# Patient Record
Sex: FEMALE | Race: BLACK OR AFRICAN AMERICAN | NOT HISPANIC OR LATINO | Employment: UNEMPLOYED | URBAN - METROPOLITAN AREA
[De-identification: names, ages, dates, MRNs, and addresses within clinical notes are randomized per-mention and may not be internally consistent; named-entity substitution may affect disease eponyms.]

---

## 2019-03-19 ENCOUNTER — OFFICE VISIT (OUTPATIENT)
Dept: SURGERY | Facility: CLINIC | Age: 19
End: 2019-03-19

## 2019-03-19 VITALS — HEART RATE: 92 BPM | OXYGEN SATURATION: 97 % | BODY MASS INDEX: 39.03 KG/M2 | WEIGHT: 242.85 LBS | HEIGHT: 66 IN

## 2019-03-19 DIAGNOSIS — L02.412 ABSCESS OF AXILLA, LEFT: Primary | ICD-10-CM

## 2019-03-19 PROCEDURE — 99203 OFFICE O/P NEW LOW 30 MIN: CPT | Performed by: SURGERY

## 2019-03-19 RX ORDER — CEFAZOLIN SODIUM 2 G/100ML
2 INJECTION, SOLUTION INTRAVENOUS ONCE
Status: CANCELLED | OUTPATIENT
Start: 2019-03-20 | End: 2019-03-19

## 2019-03-19 RX ORDER — CEPHALEXIN 500 MG/1
1 CAPSULE ORAL 2 TIMES DAILY
Refills: 1 | COMMUNITY
Start: 2019-03-04 | End: 2019-03-21 | Stop reason: HOSPADM

## 2019-03-19 RX ORDER — INSULIN DEGLUDEC 200 U/ML
40 INJECTION, SOLUTION SUBCUTANEOUS NIGHTLY
COMMUNITY
Start: 2018-12-10 | End: 2020-03-12 | Stop reason: ALTCHOICE

## 2019-03-19 NOTE — PROGRESS NOTES
"CC: Left axillary swelling     HPI: The patient is a very pleasant 18-year-old female that is here today for evaluation of cystic mass over the left axilla.  She reports having a cystic mass over the left axilla that has been slowly growing for the past month. Last week she went to [place] for sping break and noticed more swelling. She has noticed some redness but no pain or discharge.  She was seen by a dermatologist a week ago and she was started on antibiotics.  She denies any fevers or chills.  She reports having a cyst over that area approximately 2 years ago that was treated with antibiotics.  Otherwise denies any other complaint     PMH: Type 2 diabetes     PSH: Ear tubes surgery    MEDS: Metformin, Keflex     ALL: No known drug allergies    FH and SH: Family history is noncontributory.  The patient is single and goes to college.  She      ROS:   Constitutional: denies any weight changes, fatigue or weakness.  HEENT: Denies hearing loss and rhinorrhea  Cardiovascular: denies chest pain, palpitations, edemas.  Respiratory: denies cough, sputum, SOB.  Gastrointestinal: denies N&V, abd pain, diarrhea, constipation.  Genitourinary: denies dysuria, frequency.  Endocrine: denies cold intolerance, lethargy and flushing.  Hem: denies excessive bruising and postop bleeding.  Musculoskeletal: denies weakness, joint swelling, pain or stiffness.  Neuro: denies seizures, CVA, paresthesia, or peripheral neuropathy.   Skin: denies change in nevi, rashes, masses.     PE:   Vitals:    03/19/19 1416   Pulse: 92   SpO2: 97%   Weight: 110 kg (242 lb 13.6 oz)   Height: 167.6 cm (66\")     Alert and oriented ×3, no acute distress.  Head is normocephalic and atraumatic.  Neck is supple there  There is no thyromegaly or lymphadenopathy  Chest is clear bilaterally there is no added sounds. Over the left axilla there's a cystic mass palpated with 2 different localized areas of redness. There's a brownish drainage from a small skin pit " at the superior aspect of the mass. Minimal tenderness but discomfort over the area.   Regular rate and rhythm, no murmurs  Abdomen is soft and nontender, is nondistended, bowel sounds are positive.  There is no rebound or guarding is and there is no peritoneal signs.  No clubbing cyanosis or edema in lower or upper extremities    Diagnostic studies: None     Assessment and plan    The patient is a very pleasant 17 yo female with what it seem to be an infected sebaceous cyst. Interestingly, there's no much erythema or pain and this make me think this may be a cystic hygroma that's infected. Regardless, I think this need to be drained in the operative room. She will most likely have a large open wound and she will need dressing changes. The patient and her father verbalized understanding and aggred with the plan.     - Plan for incisional drainage of left axillary mass under anesthesia tomorrow  - Cont. Atx     Williams Ryan MD  General, Minimally Invasive and Endoscopic Surgery  [facility]     [address], [address]  [place], [place], [address]  P: [phone]  F: [phone]

## 2019-03-20 ENCOUNTER — HOSPITAL ENCOUNTER (OUTPATIENT)
Facility: HOSPITAL | Age: 19
Discharge: HOME | End: 2019-03-21
Attending: SURGERY | Admitting: SURGERY

## 2019-03-20 ENCOUNTER — ANESTHESIA (OUTPATIENT)
Dept: PERIOP | Facility: HOSPITAL | Age: 19
End: 2019-03-20

## 2019-03-20 ENCOUNTER — ANESTHESIA EVENT (OUTPATIENT)
Dept: PERIOP | Facility: HOSPITAL | Age: 19
End: 2019-03-20

## 2019-03-20 DIAGNOSIS — L02.412 ABSCESS OF AXILLA, LEFT: ICD-10-CM

## 2019-03-20 LAB
ANION GAP SERPL CALCULATED.3IONS-SCNC: 11.2 MMOL/L
B-HCG UR QL: NEGATIVE
BUN BLD-MCNC: 9 MG/DL (ref 6–20)
BUN/CREAT SERPL: 11.4 (ref 7–25)
CALCIUM SPEC-SCNC: 9.8 MG/DL (ref 8.6–10.5)
CHLORIDE SERPL-SCNC: 101 MMOL/L (ref 98–107)
CO2 SERPL-SCNC: 26.8 MMOL/L (ref 22–29)
CREAT BLD-MCNC: 0.79 MG/DL (ref 0.57–1)
DEPRECATED RDW RBC AUTO: 43.6 FL (ref 37–54)
ERYTHROCYTE [DISTWIDTH] IN BLOOD BY AUTOMATED COUNT: 13.2 % (ref 12.3–15.4)
GFR SERPL CREATININE-BSD FRML MDRD: 114 ML/MIN/1.73
GLUCOSE BLD-MCNC: 140 MG/DL (ref 65–99)
GLUCOSE BLDC GLUCOMTR-MCNC: 125 MG/DL (ref 70–130)
GLUCOSE BLDC GLUCOMTR-MCNC: 196 MG/DL (ref 70–130)
GLUCOSE BLDC GLUCOMTR-MCNC: 198 MG/DL (ref 70–130)
HCT VFR BLD AUTO: 36.2 % (ref 34–46.6)
HGB BLD-MCNC: 11.2 G/DL (ref 12–15.9)
INTERNAL NEGATIVE CONTROL: NEGATIVE
INTERNAL POSITIVE CONTROL: POSITIVE
Lab: NORMAL
MCH RBC QN AUTO: 27.9 PG (ref 26.6–33)
MCHC RBC AUTO-ENTMCNC: 30.9 G/DL (ref 31.5–35.7)
MCV RBC AUTO: 90.3 FL (ref 79–97)
PLATELET # BLD AUTO: 273 10*3/MM3 (ref 140–450)
PMV BLD AUTO: 12.1 FL (ref 6–12)
POTASSIUM BLD-SCNC: 4.5 MMOL/L (ref 3.5–5.2)
RBC # BLD AUTO: 4.01 10*6/MM3 (ref 3.77–5.28)
SODIUM BLD-SCNC: 139 MMOL/L (ref 136–145)
WBC NRBC COR # BLD: 8.03 10*3/MM3 (ref 3.4–10.8)

## 2019-03-20 PROCEDURE — 25010000002 HYDRALAZINE PER 20 MG: Performed by: ANESTHESIOLOGY

## 2019-03-20 PROCEDURE — G0378 HOSPITAL OBSERVATION PER HR: HCPCS

## 2019-03-20 PROCEDURE — 81025 URINE PREGNANCY TEST: CPT | Performed by: SURGERY

## 2019-03-20 PROCEDURE — 25010000002 MIDAZOLAM PER 1 MG: Performed by: ANESTHESIOLOGY

## 2019-03-20 PROCEDURE — 85027 COMPLETE CBC AUTOMATED: CPT | Performed by: SURGERY

## 2019-03-20 PROCEDURE — 25010000002 FENTANYL CITRATE (PF) 100 MCG/2ML SOLUTION: Performed by: ANESTHESIOLOGY

## 2019-03-20 PROCEDURE — 87077 CULTURE AEROBIC IDENTIFY: CPT | Performed by: SURGERY

## 2019-03-20 PROCEDURE — 87075 CULTR BACTERIA EXCEPT BLOOD: CPT | Performed by: SURGERY

## 2019-03-20 PROCEDURE — 25010000002 PROPOFOL 10 MG/ML EMULSION: Performed by: ANESTHESIOLOGY

## 2019-03-20 PROCEDURE — 87070 CULTURE OTHR SPECIMN AEROBIC: CPT | Performed by: SURGERY

## 2019-03-20 PROCEDURE — 82962 GLUCOSE BLOOD TEST: CPT

## 2019-03-20 PROCEDURE — 87186 SC STD MICRODIL/AGAR DIL: CPT | Performed by: SURGERY

## 2019-03-20 PROCEDURE — 63710000001 INSULIN LISPRO (HUMAN) PER 5 UNITS: Performed by: SURGERY

## 2019-03-20 PROCEDURE — 88304 TISSUE EXAM BY PATHOLOGIST: CPT | Performed by: SURGERY

## 2019-03-20 PROCEDURE — 25010000003 CEFAZOLIN IN DEXTROSE 2-4 GM/100ML-% SOLUTION: Performed by: SURGERY

## 2019-03-20 PROCEDURE — 87205 SMEAR GRAM STAIN: CPT | Performed by: SURGERY

## 2019-03-20 PROCEDURE — 25010000002 ENOXAPARIN PER 10 MG: Performed by: SURGERY

## 2019-03-20 PROCEDURE — 80048 BASIC METABOLIC PNL TOTAL CA: CPT | Performed by: SURGERY

## 2019-03-20 PROCEDURE — 10061 I&D ABSCESS COMP/MULTIPLE: CPT | Performed by: SURGERY

## 2019-03-20 PROCEDURE — 63710000001 INSULIN GLARGINE PER 5 UNITS: Performed by: SURGERY

## 2019-03-20 RX ORDER — PROMETHAZINE HYDROCHLORIDE 25 MG/ML
6.25 INJECTION, SOLUTION INTRAMUSCULAR; INTRAVENOUS ONCE AS NEEDED
Status: DISCONTINUED | OUTPATIENT
Start: 2019-03-20 | End: 2019-03-20 | Stop reason: HOSPADM

## 2019-03-20 RX ORDER — HYDROCODONE BITARTRATE AND ACETAMINOPHEN 5; 325 MG/1; MG/1
1 TABLET ORAL EVERY 6 HOURS PRN
Status: DISCONTINUED | OUTPATIENT
Start: 2019-03-20 | End: 2019-03-21 | Stop reason: HOSPADM

## 2019-03-20 RX ORDER — MIDAZOLAM HYDROCHLORIDE 1 MG/ML
1 INJECTION INTRAMUSCULAR; INTRAVENOUS
Status: DISCONTINUED | OUTPATIENT
Start: 2019-03-20 | End: 2019-03-20 | Stop reason: HOSPADM

## 2019-03-20 RX ORDER — HYDROMORPHONE HYDROCHLORIDE 1 MG/ML
0.25 INJECTION, SOLUTION INTRAMUSCULAR; INTRAVENOUS; SUBCUTANEOUS
Status: DISCONTINUED | OUTPATIENT
Start: 2019-03-20 | End: 2019-03-20 | Stop reason: HOSPADM

## 2019-03-20 RX ORDER — MEPERIDINE HYDROCHLORIDE 25 MG/ML
12.5 INJECTION INTRAMUSCULAR; INTRAVENOUS; SUBCUTANEOUS
Status: DISCONTINUED | OUTPATIENT
Start: 2019-03-20 | End: 2019-03-20 | Stop reason: HOSPADM

## 2019-03-20 RX ORDER — FENTANYL CITRATE 50 UG/ML
50 INJECTION, SOLUTION INTRAMUSCULAR; INTRAVENOUS
Status: DISCONTINUED | OUTPATIENT
Start: 2019-03-20 | End: 2019-03-20 | Stop reason: HOSPADM

## 2019-03-20 RX ORDER — ACETAMINOPHEN 325 MG/1
650 TABLET ORAL ONCE AS NEEDED
Status: DISCONTINUED | OUTPATIENT
Start: 2019-03-20 | End: 2019-03-20 | Stop reason: HOSPADM

## 2019-03-20 RX ORDER — SODIUM CHLORIDE 0.9 % (FLUSH) 0.9 %
3 SYRINGE (ML) INJECTION EVERY 12 HOURS SCHEDULED
Status: DISCONTINUED | OUTPATIENT
Start: 2019-03-20 | End: 2019-03-20 | Stop reason: HOSPADM

## 2019-03-20 RX ORDER — SODIUM CHLORIDE 9 MG/ML
100 INJECTION, SOLUTION INTRAVENOUS CONTINUOUS
Status: DISCONTINUED | OUTPATIENT
Start: 2019-03-20 | End: 2019-03-20

## 2019-03-20 RX ORDER — SODIUM CHLORIDE 0.9 % (FLUSH) 0.9 %
1-10 SYRINGE (ML) INJECTION AS NEEDED
Status: DISCONTINUED | OUTPATIENT
Start: 2019-03-20 | End: 2019-03-20 | Stop reason: HOSPADM

## 2019-03-20 RX ORDER — LABETALOL HYDROCHLORIDE 5 MG/ML
5 INJECTION, SOLUTION INTRAVENOUS
Status: DISCONTINUED | OUTPATIENT
Start: 2019-03-20 | End: 2019-03-20 | Stop reason: HOSPADM

## 2019-03-20 RX ORDER — INSULIN GLARGINE 100 [IU]/ML
40 INJECTION, SOLUTION SUBCUTANEOUS NIGHTLY
Status: DISCONTINUED | OUTPATIENT
Start: 2019-03-20 | End: 2019-03-21 | Stop reason: HOSPADM

## 2019-03-20 RX ORDER — PROMETHAZINE HYDROCHLORIDE 25 MG/1
25 TABLET ORAL ONCE AS NEEDED
Status: DISCONTINUED | OUTPATIENT
Start: 2019-03-20 | End: 2019-03-20 | Stop reason: HOSPADM

## 2019-03-20 RX ORDER — SODIUM CHLORIDE 0.9 % (FLUSH) 0.9 %
3-10 SYRINGE (ML) INJECTION AS NEEDED
Status: DISCONTINUED | OUTPATIENT
Start: 2019-03-20 | End: 2019-03-21 | Stop reason: HOSPADM

## 2019-03-20 RX ORDER — PROMETHAZINE HYDROCHLORIDE 25 MG/ML
12.5 INJECTION, SOLUTION INTRAMUSCULAR; INTRAVENOUS EVERY 6 HOURS PRN
Status: DISCONTINUED | OUTPATIENT
Start: 2019-03-20 | End: 2019-03-21 | Stop reason: HOSPADM

## 2019-03-20 RX ORDER — DEXTROSE MONOHYDRATE 25 G/50ML
25 INJECTION, SOLUTION INTRAVENOUS
Status: DISCONTINUED | OUTPATIENT
Start: 2019-03-20 | End: 2019-03-21 | Stop reason: HOSPADM

## 2019-03-20 RX ORDER — MAGNESIUM HYDROXIDE 1200 MG/15ML
LIQUID ORAL AS NEEDED
Status: DISCONTINUED | OUTPATIENT
Start: 2019-03-20 | End: 2019-03-20 | Stop reason: HOSPADM

## 2019-03-20 RX ORDER — PROPOFOL 10 MG/ML
VIAL (ML) INTRAVENOUS AS NEEDED
Status: DISCONTINUED | OUTPATIENT
Start: 2019-03-20 | End: 2019-03-20 | Stop reason: SURG

## 2019-03-20 RX ORDER — SODIUM CHLORIDE, SODIUM LACTATE, POTASSIUM CHLORIDE, CALCIUM CHLORIDE 600; 310; 30; 20 MG/100ML; MG/100ML; MG/100ML; MG/100ML
9 INJECTION, SOLUTION INTRAVENOUS CONTINUOUS
Status: DISCONTINUED | OUTPATIENT
Start: 2019-03-20 | End: 2019-03-20

## 2019-03-20 RX ORDER — FAMOTIDINE 10 MG/ML
20 INJECTION, SOLUTION INTRAVENOUS ONCE
Status: COMPLETED | OUTPATIENT
Start: 2019-03-20 | End: 2019-03-20

## 2019-03-20 RX ORDER — PROMETHAZINE HYDROCHLORIDE 25 MG/1
25 SUPPOSITORY RECTAL ONCE AS NEEDED
Status: DISCONTINUED | OUTPATIENT
Start: 2019-03-20 | End: 2019-03-20 | Stop reason: HOSPADM

## 2019-03-20 RX ORDER — GLYCOPYRROLATE 0.2 MG/ML
0.2 INJECTION INTRAMUSCULAR; INTRAVENOUS
Status: COMPLETED | OUTPATIENT
Start: 2019-03-20 | End: 2019-03-20

## 2019-03-20 RX ORDER — BUPIVACAINE HYDROCHLORIDE AND EPINEPHRINE 5; 5 MG/ML; UG/ML
INJECTION, SOLUTION EPIDURAL; INTRACAUDAL; PERINEURAL AS NEEDED
Status: DISCONTINUED | OUTPATIENT
Start: 2019-03-20 | End: 2019-03-20 | Stop reason: HOSPADM

## 2019-03-20 RX ORDER — ACETAMINOPHEN 325 MG/1
650 TABLET ORAL EVERY 4 HOURS PRN
Status: DISCONTINUED | OUTPATIENT
Start: 2019-03-20 | End: 2019-03-21 | Stop reason: HOSPADM

## 2019-03-20 RX ORDER — CEFAZOLIN SODIUM 2 G/100ML
2 INJECTION, SOLUTION INTRAVENOUS ONCE
Status: COMPLETED | OUTPATIENT
Start: 2019-03-20 | End: 2019-03-20

## 2019-03-20 RX ORDER — HYDRALAZINE HYDROCHLORIDE 20 MG/ML
5 INJECTION INTRAMUSCULAR; INTRAVENOUS
Status: DISCONTINUED | OUTPATIENT
Start: 2019-03-20 | End: 2019-03-20 | Stop reason: HOSPADM

## 2019-03-20 RX ORDER — PROPOFOL 10 MG/ML
VIAL (ML) INTRAVENOUS CONTINUOUS PRN
Status: DISCONTINUED | OUTPATIENT
Start: 2019-03-20 | End: 2019-03-20 | Stop reason: SURG

## 2019-03-20 RX ORDER — SULFAMETHOXAZOLE AND TRIMETHOPRIM 400; 80 MG/1; MG/1
1 TABLET ORAL EVERY 12 HOURS SCHEDULED
Status: DISCONTINUED | OUTPATIENT
Start: 2019-03-20 | End: 2019-03-21

## 2019-03-20 RX ORDER — MIDAZOLAM HYDROCHLORIDE 1 MG/ML
2 INJECTION INTRAMUSCULAR; INTRAVENOUS
Status: DISCONTINUED | OUTPATIENT
Start: 2019-03-20 | End: 2019-03-20 | Stop reason: HOSPADM

## 2019-03-20 RX ORDER — LIDOCAINE HYDROCHLORIDE 10 MG/ML
0.5 INJECTION, SOLUTION EPIDURAL; INFILTRATION; INTRACAUDAL; PERINEURAL ONCE AS NEEDED
Status: DISCONTINUED | OUTPATIENT
Start: 2019-03-20 | End: 2019-03-20 | Stop reason: HOSPADM

## 2019-03-20 RX ORDER — DIPHENHYDRAMINE HYDROCHLORIDE 50 MG/ML
12.5 INJECTION INTRAMUSCULAR; INTRAVENOUS
Status: DISCONTINUED | OUTPATIENT
Start: 2019-03-20 | End: 2019-03-20 | Stop reason: HOSPADM

## 2019-03-20 RX ORDER — NICOTINE POLACRILEX 4 MG
15 LOZENGE BUCCAL
Status: DISCONTINUED | OUTPATIENT
Start: 2019-03-20 | End: 2019-03-21 | Stop reason: HOSPADM

## 2019-03-20 RX ORDER — LIDOCAINE HYDROCHLORIDE 20 MG/ML
INJECTION, SOLUTION INFILTRATION; PERINEURAL AS NEEDED
Status: DISCONTINUED | OUTPATIENT
Start: 2019-03-20 | End: 2019-03-20 | Stop reason: SURG

## 2019-03-20 RX ORDER — FENTANYL CITRATE 50 UG/ML
100 INJECTION, SOLUTION INTRAMUSCULAR; INTRAVENOUS
Status: DISCONTINUED | OUTPATIENT
Start: 2019-03-20 | End: 2019-03-20 | Stop reason: HOSPADM

## 2019-03-20 RX ORDER — SODIUM CHLORIDE 0.9 % (FLUSH) 0.9 %
3 SYRINGE (ML) INJECTION EVERY 12 HOURS SCHEDULED
Status: DISCONTINUED | OUTPATIENT
Start: 2019-03-20 | End: 2019-03-21 | Stop reason: HOSPADM

## 2019-03-20 RX ADMIN — FENTANYL CITRATE 50 MCG: 50 INJECTION INTRAMUSCULAR; INTRAVENOUS at 12:28

## 2019-03-20 RX ADMIN — Medication 1 MG: at 11:40

## 2019-03-20 RX ADMIN — Medication 3 ML: at 15:03

## 2019-03-20 RX ADMIN — HYDRALAZINE HYDROCHLORIDE 5 MG: 20 INJECTION INTRAMUSCULAR; INTRAVENOUS at 13:35

## 2019-03-20 RX ADMIN — FENTANYL CITRATE 50 MCG: 50 INJECTION, SOLUTION INTRAMUSCULAR; INTRAVENOUS at 13:49

## 2019-03-20 RX ADMIN — GLYCOPYRROLATE 0.2 MG: 0.2 INJECTION, SOLUTION INTRAMUSCULAR; INTRAVENOUS at 11:40

## 2019-03-20 RX ADMIN — FAMOTIDINE 20 MG: 10 INJECTION INTRAVENOUS at 11:40

## 2019-03-20 RX ADMIN — SODIUM CHLORIDE, POTASSIUM CHLORIDE, SODIUM LACTATE AND CALCIUM CHLORIDE: 600; 310; 30; 20 INJECTION, SOLUTION INTRAVENOUS at 11:52

## 2019-03-20 RX ADMIN — FENTANYL CITRATE 50 MCG: 50 INJECTION, SOLUTION INTRAMUSCULAR; INTRAVENOUS at 13:25

## 2019-03-20 RX ADMIN — Medication 1 MG: at 12:24

## 2019-03-20 RX ADMIN — INSULIN LISPRO 2 UNITS: 100 INJECTION, SOLUTION INTRAVENOUS; SUBCUTANEOUS at 17:33

## 2019-03-20 RX ADMIN — SODIUM CHLORIDE, PRESERVATIVE FREE 3 ML: 5 INJECTION INTRAVENOUS at 20:48

## 2019-03-20 RX ADMIN — PROPOFOL 150 MG: 10 INJECTION, EMULSION INTRAVENOUS at 12:24

## 2019-03-20 RX ADMIN — INSULIN LISPRO 2 UNITS: 100 INJECTION, SOLUTION INTRAVENOUS; SUBCUTANEOUS at 23:11

## 2019-03-20 RX ADMIN — CEFAZOLIN SODIUM 2 G: 2 INJECTION, SOLUTION INTRAVENOUS at 12:25

## 2019-03-20 RX ADMIN — METFORMIN HYDROCHLORIDE 500 MG: 500 TABLET ORAL at 18:18

## 2019-03-20 RX ADMIN — ENOXAPARIN SODIUM 30 MG: 30 INJECTION SUBCUTANEOUS at 20:53

## 2019-03-20 RX ADMIN — Medication 1 MG: at 12:28

## 2019-03-20 RX ADMIN — INSULIN GLARGINE 40 UNITS: 100 INJECTION, SOLUTION SUBCUTANEOUS at 23:11

## 2019-03-20 RX ADMIN — LIDOCAINE HYDROCHLORIDE 40 MG: 20 INJECTION, SOLUTION INFILTRATION; PERINEURAL at 12:24

## 2019-03-20 RX ADMIN — SULFAMETHOXAZOLE AND TRIMETHOPRIM 1 TABLET: 400; 80 TABLET ORAL at 20:48

## 2019-03-20 RX ADMIN — SODIUM CHLORIDE 100 ML/HR: 9 INJECTION, SOLUTION INTRAVENOUS at 15:02

## 2019-03-20 RX ADMIN — FENTANYL CITRATE 50 MCG: 50 INJECTION INTRAMUSCULAR; INTRAVENOUS at 12:24

## 2019-03-20 RX ADMIN — PROPOFOL 100 MCG/KG/MIN: 10 INJECTION, EMULSION INTRAVENOUS at 12:24

## 2019-03-20 NOTE — ANESTHESIA POSTPROCEDURE EVALUATION
Patient: Frances Overall    Procedure Summary     Date:  03/20/19 Room / Location:  [facility] OR [address] [place] [address] MAIN OR    Anesthesia Start:  1220 Anesthesia Stop:  1314    Procedure:  Incisional drainage of left axillary abscess (Left ) Diagnosis:       Abscess of axilla, left      (Abscess of axilla, left [L02.412])    Surgeon:  Williams Ryan MD Provider:  Mayur Eng MD    Anesthesia Type:  MAC ASA Status:  2          Anesthesia Type: MAC  Last vitals  BP   144/88 (03/20/19 1345)   Temp   36.6 °C (97.8 °F) (03/20/19 1308)   Pulse   64 (03/20/19 1345)   Resp   16 (03/20/19 1345)     SpO2   100 % (03/20/19 1345)     Post Anesthesia Care and Evaluation    Patient location during evaluation: bedside  Patient participation: complete - patient participated  Level of consciousness: awake  Pain score: 1  Pain management: adequate  Airway patency: patent  Anesthetic complications: No anesthetic complications    Cardiovascular status: acceptable  Respiratory status: acceptable  Hydration status: acceptable    Comments: --------------------            03/20/19               1345     --------------------   BP:       144/88     Pulse:      64       Resp:       16       Temp:                SpO2:      100%     --------------------

## 2019-03-20 NOTE — ANESTHESIA POSTPROCEDURE EVALUATION
Patient: Frances Overall    Procedure Summary     Date:  03/20/19 Room / Location:  [facility] OR [address] [place] [address] MAIN OR    Anesthesia Start:  1220 Anesthesia Stop:  1314    Procedure:  Incisional drainage of left axillary abscess (Left ) Diagnosis:       Abscess of axilla, left      (Abscess of axilla, left [L02.412])    Surgeon:  Williams Ryan MD Provider:  Mayur Eng MD    Anesthesia Type:  MAC ASA Status:  2          Anesthesia Type: MAC  Last vitals  BP   144/88 (03/20/19 1345)   Temp   36.6 °C (97.8 °F) (03/20/19 1308)   Pulse   64 (03/20/19 1345)   Resp   16 (03/20/19 1345)     SpO2   100 % (03/20/19 1345)     Post Anesthesia Care and Evaluation    Patient location during evaluation: PACU  Patient participation: complete - patient participated  Level of consciousness: awake and alert  Pain management: adequate  Airway patency: patent  Anesthetic complications: No anesthetic complications    Cardiovascular status: acceptable  Respiratory status: acceptable  Hydration status: acceptable    Comments: --------------------            03/20/19               1345     --------------------   BP:       144/88     Pulse:      64       Resp:       16       Temp:                SpO2:      100%     --------------------

## 2019-03-20 NOTE — ANESTHESIA PREPROCEDURE EVALUATION
Anesthesia Evaluation     Patient summary reviewed and Nursing notes reviewed   NPO Solid Status: > 8 hours             Airway   Mallampati: II  TM distance: >3 FB  Neck ROM: full  no difficulty expected  Dental - normal exam     Pulmonary - negative pulmonary ROS and normal exam   Cardiovascular - negative cardio ROS and normal exam        Neuro/Psych- negative ROS  GI/Hepatic/Renal/Endo    (+) morbid obesity,  diabetes mellitus using insulin,     Musculoskeletal (-) negative ROS    Abdominal  - normal exam   Substance History - negative use     OB/GYN negative ob/gyn ROS         Other                        Anesthesia Plan    ASA 2     MAC and general     intravenous induction   Anesthetic plan, all risks, benefits, and alternatives have been provided, discussed and informed consent has been obtained with: patient.    Plan discussed with CRNA.

## 2019-03-21 VITALS
WEIGHT: 244.3 LBS | DIASTOLIC BLOOD PRESSURE: 62 MMHG | OXYGEN SATURATION: 99 % | HEIGHT: 66 IN | BODY MASS INDEX: 39.26 KG/M2 | SYSTOLIC BLOOD PRESSURE: 113 MMHG | TEMPERATURE: 97.9 F | RESPIRATION RATE: 16 BRPM | HEART RATE: 69 BPM

## 2019-03-21 PROBLEM — E66.9 OBESITY (BMI 35.0-39.9 WITHOUT COMORBIDITY): Status: ACTIVE | Noted: 2019-03-21

## 2019-03-21 PROBLEM — L02.412 ABSCESS OF AXILLA, LEFT: Status: RESOLVED | Noted: 2019-03-19 | Resolved: 2019-03-21

## 2019-03-21 PROBLEM — E11.9 DIABETES (HCC): Status: ACTIVE | Noted: 2019-03-21

## 2019-03-21 LAB
CYTO UR: NORMAL
GLUCOSE BLDC GLUCOMTR-MCNC: 111 MG/DL (ref 70–130)
GLUCOSE BLDC GLUCOMTR-MCNC: 119 MG/DL (ref 70–130)
GLUCOSE BLDC GLUCOMTR-MCNC: 146 MG/DL (ref 70–130)
LAB AP CASE REPORT: NORMAL
PATH REPORT.FINAL DX SPEC: NORMAL
PATH REPORT.GROSS SPEC: NORMAL

## 2019-03-21 PROCEDURE — 25010000002 ENOXAPARIN PER 10 MG: Performed by: SURGERY

## 2019-03-21 PROCEDURE — 82962 GLUCOSE BLOOD TEST: CPT

## 2019-03-21 PROCEDURE — 99024 POSTOP FOLLOW-UP VISIT: CPT | Performed by: SURGERY

## 2019-03-21 PROCEDURE — G0378 HOSPITAL OBSERVATION PER HR: HCPCS

## 2019-03-21 RX ORDER — AMOXICILLIN AND CLAVULANATE POTASSIUM 500; 125 MG/1; MG/1
1 TABLET, FILM COATED ORAL EVERY 8 HOURS SCHEDULED
Status: DISCONTINUED | OUTPATIENT
Start: 2019-03-21 | End: 2019-03-21 | Stop reason: HOSPADM

## 2019-03-21 RX ORDER — AMOXICILLIN AND CLAVULANATE POTASSIUM 500; 125 MG/1; MG/1
1 TABLET, FILM COATED ORAL EVERY 8 HOURS SCHEDULED
Qty: 21 TABLET | Refills: 0 | Status: SHIPPED | OUTPATIENT
Start: 2019-03-21 | End: 2019-03-28

## 2019-03-21 RX ORDER — AMOXICILLIN 250 MG
2 CAPSULE ORAL DAILY PRN
Qty: 30 TABLET | Refills: 1 | Status: SHIPPED | OUTPATIENT
Start: 2019-03-21 | End: 2019-03-29

## 2019-03-21 RX ORDER — SULFAMETHOXAZOLE AND TRIMETHOPRIM 800; 160 MG/1; MG/1
1 TABLET ORAL EVERY 12 HOURS SCHEDULED
Status: DISCONTINUED | OUTPATIENT
Start: 2019-03-21 | End: 2019-03-21

## 2019-03-21 RX ORDER — HYDROCODONE BITARTRATE AND ACETAMINOPHEN 5; 325 MG/1; MG/1
1 TABLET ORAL EVERY 6 HOURS PRN
Qty: 20 TABLET | Refills: 0 | Status: SHIPPED | OUTPATIENT
Start: 2019-03-21 | End: 2019-03-30

## 2019-03-21 RX ADMIN — METFORMIN HYDROCHLORIDE 500 MG: 500 TABLET ORAL at 08:54

## 2019-03-21 RX ADMIN — SULFAMETHOXAZOLE AND TRIMETHOPRIM 1 TABLET: 400; 80 TABLET ORAL at 08:54

## 2019-03-21 RX ADMIN — SODIUM CHLORIDE, PRESERVATIVE FREE 3 ML: 5 INJECTION INTRAVENOUS at 08:55

## 2019-03-21 RX ADMIN — ENOXAPARIN SODIUM 30 MG: 30 INJECTION SUBCUTANEOUS at 08:54

## 2019-03-22 LAB
BACTERIA SPEC AEROBE CULT: ABNORMAL
BACTERIA SPEC AEROBE CULT: ABNORMAL
GRAM STN SPEC: ABNORMAL

## 2019-03-25 LAB — BACTERIA SPEC ANAEROBE CULT: NORMAL

## 2019-03-29 ENCOUNTER — OFFICE VISIT (OUTPATIENT)
Dept: SURGERY | Facility: CLINIC | Age: 19
End: 2019-03-29

## 2019-03-29 DIAGNOSIS — Z51.89 VISIT FOR WOUND CHECK: Primary | ICD-10-CM

## 2019-03-29 PROCEDURE — 99024 POSTOP FOLLOW-UP VISIT: CPT | Performed by: SURGERY

## 2019-03-29 NOTE — PROGRESS NOTES
CC: Follow-up after incision and drainage of left axillary abscess    S: Patient is here today for follow-up and wound check.  She reports no complaints other than mild pain with dressing changes.  Otherwise denies any fevers or chills.  She has been doing wound VAC dressing changes every 3 days    O:   Alert and oriented x3, no acute distress  Over the left axilla there is an open wound that is approximately 3 x 2 cm and there is granulation tissue at the base.  There is no drainage.  Wound VAC is in place       Wound Culture Heavy growth (4+) Klebsiella pneumoniae Abnormal        Heavy growth (4+) Proteus mirabilis Abnormal               Gram Stain  Many (4+) WBCs seen      Few (2+) Gram negative bacilli      Few (2+) Gram positive cocci in clusters      Few (2+) Gram positive bacilli            Resulting Agency: [facility]   Susceptibility      Klebsiella pneumoniae     BHARAT     Ampicillin >=32 ug/ml Resistant     Ampicillin + Sulbactam 8 ug/ml Susceptible     Cefazolin <=4 ug/ml Susceptible     Cefepime <=1 ug/ml Susceptible     Ceftazidime <=1 ug/ml Susceptible     Ceftriaxone <=1 ug/ml Susceptible     Gentamicin <=1 ug/ml Susceptible     Levofloxacin <=0.12 ug/ml Susceptible     Piperacillin + Tazobactam <=4 ug/ml Susceptible     Tetracycline <=1 ug/ml Susceptible     Trimethoprim + Sulfamethoxazole <=20 ug/ml Susceptible           Susceptibility      Proteus mirabilis     BHARAT     Ampicillin <=2 ug/ml Susceptible     Ampicillin + Sulbactam <=2 ug/ml Susceptible     Cefazolin <=4 ug/ml Susceptible     Cefepime <=1 ug/ml Susceptible     Ceftazidime <=1 ug/ml Susceptible     Ceftriaxone <=1 ug/ml Susceptible     Gentamicin <=1 ug/ml Susceptible     Levofloxacin <=0.12 ug/ml Susceptible     Piperacillin + Tazobactam <=4 ug/ml Susceptible     Tetracycline >=16 ug/ml Resistant     Trimethoprim + Sulfamethoxazole <=20 ug/ml Susceptible                 Specimen Collected: 03/20/19 12:44        Assessment and  plan    18-year-old female status post incision and drainage of large left axillary abscess.  The wound is healing great without any signs of infection.  She has been performing wound VAC dressing changes every 3 days.  Discussed with the patient about further steps.  I think that if she feels comfortable performing wet-to-dry dressing changes then that may be easier to having a wound VAC while she is at school.  She will follow-up with wound care clinic at [place] and decide whether she wants to continue with wound VAC which she will be able to switch to wet-to-dry dressing changes twice a day.    -Continue wound VAC dressing changes every 3 days  -Complete course of antibiotics  -Follow-up in my office in 2 weeks    Williams Ryan MD, FACS  General, Minimally Invasive and Endoscopic Surgery  [facility]    [address] [place] [address], [address]  [place], [place], [address]  P: [phone]  F: [phone]

## 2020-03-12 ENCOUNTER — OFFICE VISIT (OUTPATIENT)
Dept: SURGERY | Facility: CLINIC | Age: 20
End: 2020-03-12

## 2020-03-12 VITALS — OXYGEN SATURATION: 98 % | WEIGHT: 241 LBS | HEART RATE: 85 BPM | BODY MASS INDEX: 38.73 KG/M2 | HEIGHT: 66 IN

## 2020-03-12 DIAGNOSIS — L72.3 SEBACEOUS CYST: Primary | ICD-10-CM

## 2020-03-12 PROCEDURE — 99213 OFFICE O/P EST LOW 20 MIN: CPT | Performed by: SURGERY

## 2020-03-12 RX ORDER — SULFAMETHOXAZOLE AND TRIMETHOPRIM 800; 160 MG/1; MG/1
1 TABLET ORAL 2 TIMES DAILY
Qty: 20 TABLET | Refills: 0 | Status: SHIPPED | OUTPATIENT
Start: 2020-03-12 | End: 2020-03-24

## 2020-03-12 RX ORDER — ONDANSETRON 2 MG/ML
4 INJECTION INTRAMUSCULAR; INTRAVENOUS EVERY 6 HOURS PRN
Status: CANCELLED | OUTPATIENT
Start: 2020-03-12

## 2020-03-13 NOTE — PROGRESS NOTES
Office Consultation    No ref. provider found    Chief Complaint   Patient presents with   • Left Axillary Pain       HPI:       Frances Berkowitz is a 19 y.o. female who presents for evaluation of a subcutaneous nodule located over the left axilla.   The patient has history of incision and drainage of left axillary abscess that was done by me on March 2019.  Approximately, 5 days ago she started having left axillary pain.  She denies any fever, chills or drainage.  Pain happens with movement of the arm and gets better with rest.  She has been taking Tylenol with good control of her symptoms.  She denies any fevers or chills.  No redness    Past Medical History:   Diagnosis Date   • Diabetes mellitus (CMS/HCC)     Type II       Past Surgical History:   Procedure Laterality Date   • EAR TUBES     • INCISION AND DRAINAGE TRUNK Left 3/20/2019    Procedure: Incisional drainage of left axillary abscess;  Surgeon: Williams Ryan MD;  Location: [facility];  Service: General       Current Outpatient Medications on File Prior to Visit   Medication Sig Dispense Refill   • Dulaglutide (TRULICITY) 0.75 MG/0.5ML solution pen-injector INJECT 1 DOSE SUBCUTANEOUSLY ONCE A WEEK     • metFORMIN (GLUCOPHAGE) 500 MG tablet Take 500 mg by mouth 2 (Two) Times a Day With Meals.       No current facility-administered medications on file prior to visit.        No Known Allergies    Social History     Socioeconomic History   • Marital status: Single     Spouse name: Not on file   • Number of children: Not on file   • Years of education: Not on file   • Highest education level: Not on file   Tobacco Use   • Smoking status: Never Smoker   • Smokeless tobacco: Never Used   Substance and Sexual Activity   • Alcohol use: No   • Drug use: No   • Sexual activity: Defer       Family History   Problem Relation Age of Onset   • Diabetes Mother    • No Known Problems Father        REVIEW OF SYSTEMS    CONSTITUTIONAL: Denies fevers,  "chills, unintentional weight loss or weight gai  RESPIRATORY: Denies chronic cough or sob.   CARDIAC: Denies chest pain, palpitations, edema  GI: Denies dyspepsia, reflux, heartburn, nausea, vomiting, diarrhea or constipation  : Denies dysuria or hematuria.    MUSCULOSKELETAL: Denies muscle weakness and pain   NEURO: Denies chronic headaches.   ENDOCRINE: Denies significant heat or cold intolerance or history of thyroid problems.    DERM: no rashes,lesions or discharge.     Physical Examination  Pulse 85   Ht 167.6 cm (66\")   Wt 109 kg (241 lb)   SpO2 98%   BMI 38.90 kg/m²   Body mass index is 38.9 kg/m².  GENERAL:alert, well appearing, and in no distress and oriented to person, place, and time  HEENT: normochephalic, atraumatic, no scleral icterus moist mucous membranes.  NECK: Supple there is no thyromegaly or lymphadenopathy  CHEST: clear to auscultation, no wheezes, rales or rhonchi, symmetric air entry.  Over the left axilla there is a well-healed scar.  There is tender 2 x 2 centimeter subcutaneous mass.  There is no erythema or signs of infection  CARDIAC: regular rate and rhythm    NEURO: alert and oriented, normal speech, cranial nerves 2-12 grossly intact, no focal deficits      Assessment:     The patient is a very pleasant 19 y.o. female with a subcutaneous mass located at the left axilla.  She had prior incision and drainage of very large infected sebaceous cyst.  I think that her pain is related to irritation of the cyst that was prior incised.  I discussed with her about further step.  I recommend that she undergoes excisional biopsy of left axillary cyst.  Risk and benefits of the procedure including bleeding, infection, wound complications including dehiscing, infection.  She verbalized understanding and agreed with the plan  I also recommend that she starts Bactrim for 10 days due to the risk of infection and ongoing pain    Plan:          1.  Plan for excisional biopsy of left axillary soft " tissue mass  2.  I prescribed Bactrim for 10 days  3.  Surgical scheduling started    Williams Ryan MD  General, Minimally Invasive and Endoscopic Surgery  [facility]    [address], [address]  [place], [place], [address]  P: [phone]  F: [phone]

## 2020-03-24 ENCOUNTER — TELEPHONE (OUTPATIENT)
Dept: SURGERY | Facility: CLINIC | Age: 20
End: 2020-03-24

## 2020-03-24 RX ORDER — DOXYCYCLINE HYCLATE 100 MG/1
100 TABLET, DELAYED RELEASE ORAL 2 TIMES DAILY
Qty: 20 TABLET | Refills: 0 | Status: SHIPPED | OUTPATIENT
Start: 2020-03-24 | End: 2020-09-01

## 2020-03-24 NOTE — TELEPHONE ENCOUNTER
Received prior authorization request from pt's pharmacy for doxycycline that was prescribed today. The prescription was was for doxycycline 100 mg enteric coated tablets. Spoke with Dr. Ryan who advised that the prescription can be changed to regular doxycycline 100 mg tablets. Called pharmacy and changed pt's prescription.

## 2020-03-24 NOTE — TELEPHONE ENCOUNTER
Start taking doxicicline for 10 days, stop bactrim, if continue to have issues then can call and get seen

## 2020-03-24 NOTE — TELEPHONE ENCOUNTER
Pt's sx was 3/30 got cancelled due to coronavirus now experiencing pain swelling & red streaks what to do?

## 2020-03-25 NOTE — TELEPHONE ENCOUNTER
The Doxycycline 100 mg tablet also required a PA so the pharmacy changed the RX to capsule form and the patients out-of-pocket cost will be $6.00

## 2020-03-30 ENCOUNTER — TELEPHONE (OUTPATIENT)
Dept: SURGERY | Facility: CLINIC | Age: 20
End: 2020-03-30

## 2020-03-30 NOTE — TELEPHONE ENCOUNTER
Patient's mother called again stating the abscess has ruptured and has had quite a bit of purulent drainage. She wanted to know what you recommend as far as wound care. Advised that they should wash the area with soap and water and keep covered with dry gauze. Any further recommendations for wound care? Do you want to see pt in office?

## 2020-03-30 NOTE — TELEPHONE ENCOUNTER
Pts mother called in this morning stating her daughter is still having issues after taking this antibiotic. Says getting bigger and having a hard time putting arm down without discomfort. Your last message stated if she didn't get any better would need to come in for an appt. Should I schedule her to see you ? Please advise    [phone]

## 2020-03-30 NOTE — TELEPHONE ENCOUNTER
Spoke with patient mother, states patient abscess busted and started draining. She is experience great relief from drainage. Discussed with her about the need to clean the wound with soap and water and cover it with gauze at least twice a day. Patient to call me on fridat and let me know how shes doing or early if worsening pain. She understood

## 2020-05-15 ENCOUNTER — TELEPHONE (OUTPATIENT)
Dept: SURGERY | Facility: CLINIC | Age: 20
End: 2020-05-15

## 2020-05-15 NOTE — TELEPHONE ENCOUNTER
Attempted to reach pt(5/6, 5/11, 5/15) to r/s her surgery. We have not heard back from her at this point to r/s

## 2020-08-21 ENCOUNTER — PREP FOR SURGERY (OUTPATIENT)
Dept: OTHER | Facility: HOSPITAL | Age: 20
End: 2020-08-21

## 2020-08-21 ENCOUNTER — TELEPHONE (OUTPATIENT)
Dept: SURGERY | Facility: CLINIC | Age: 20
End: 2020-08-21

## 2020-08-21 DIAGNOSIS — M79.89 SOFT TISSUE MASS: Primary | ICD-10-CM

## 2020-08-21 RX ORDER — SODIUM CHLORIDE 0.9 % (FLUSH) 0.9 %
3 SYRINGE (ML) INJECTION EVERY 12 HOURS SCHEDULED
Status: CANCELLED | OUTPATIENT
Start: 2020-09-14

## 2020-08-21 RX ORDER — ACETAMINOPHEN 325 MG/1
650 TABLET ORAL ONCE
Status: CANCELLED | OUTPATIENT
Start: 2020-09-14 | End: 2020-08-21

## 2020-08-21 RX ORDER — CEFAZOLIN SODIUM 2 G/100ML
2 INJECTION, SOLUTION INTRAVENOUS ONCE
Status: CANCELLED | OUTPATIENT
Start: 2020-09-14 | End: 2020-08-21

## 2020-08-21 RX ORDER — SODIUM CHLORIDE 0.9 % (FLUSH) 0.9 %
10 SYRINGE (ML) INJECTION AS NEEDED
Status: CANCELLED | OUTPATIENT
Start: 2020-09-14

## 2020-08-21 RX ORDER — ONDANSETRON 2 MG/ML
4 INJECTION INTRAMUSCULAR; INTRAVENOUS EVERY 6 HOURS PRN
Status: CANCELLED | OUTPATIENT
Start: 2020-08-21

## 2020-08-21 RX ORDER — CELECOXIB 200 MG/1
200 CAPSULE ORAL ONCE
Status: CANCELLED | OUTPATIENT
Start: 2020-09-14 | End: 2020-08-21

## 2020-08-21 NOTE — TELEPHONE ENCOUNTER
Schedule, she needs to know that if the day of surgery she has drainage, opening or any signs of infection then she will get an incision and drainage and not a complete excision.

## 2020-08-25 PROBLEM — M79.89 SOFT TISSUE MASS: Status: ACTIVE | Noted: 2020-08-25

## 2020-08-27 ENCOUNTER — TRANSCRIBE ORDERS (OUTPATIENT)
Dept: SLEEP MEDICINE | Facility: HOSPITAL | Age: 20
End: 2020-08-27

## 2020-08-31 ENCOUNTER — TRANSCRIBE ORDERS (OUTPATIENT)
Dept: SLEEP MEDICINE | Facility: HOSPITAL | Age: 20
End: 2020-08-31

## 2020-08-31 DIAGNOSIS — Z01.818 OTHER SPECIFIED PRE-OPERATIVE EXAMINATION: Primary | ICD-10-CM

## 2020-09-01 ENCOUNTER — PROCEDURE VISIT (OUTPATIENT)
Dept: SURGERY | Facility: CLINIC | Age: 20
End: 2020-09-01

## 2020-09-01 DIAGNOSIS — L72.3 SEBACEOUS CYST: Primary | ICD-10-CM

## 2020-09-01 PROCEDURE — 99213 OFFICE O/P EST LOW 20 MIN: CPT | Performed by: SURGERY

## 2020-09-02 NOTE — PROGRESS NOTES
CC: Follow-up left axillary cyst      HPI:       Frances Berkowitz is a 20 y.o. female who I have seen in the past for evaluation of infected subcutaneous nodule over the axillary area. The patient has history of incision and drainage of left axillary abscess that was drained by me on March 2019.  On March 2020 I saw her and I recommend that she undergoes excisional biopsy of left axillary soft tissue mass.  She did not schedule surgery because wanted to wait.  2 days ago she noticed small amount of drainage.  She started taking doxycycline that she had at home.  She has not had any drainage since then.  Denies any fevers and chills.  Continues to have discomfort over the left axilla      Past Medical History:   Diagnosis Date   • Diabetes mellitus (CMS/HCC)     Type II       Past Surgical History:   Procedure Laterality Date   • EAR TUBES     • INCISION AND DRAINAGE TRUNK Left 3/20/2019    Procedure: Incisional drainage of left axillary abscess;  Surgeon: Williams Ryan MD;  Location: [facility];  Service: General       Current Outpatient Medications on File Prior to Visit   Medication Sig Dispense Refill   • Dulaglutide (TRULICITY) 0.75 MG/0.5ML solution pen-injector INJECT 1 DOSE SUBCUTANEOUSLY ONCE A WEEK     • metFORMIN (GLUCOPHAGE) 500 MG tablet Take 500 mg by mouth 2 (Two) Times a Day With Meals.       No current facility-administered medications on file prior to visit.        No Known Allergies    Social History     Socioeconomic History   • Marital status: Single     Spouse name: Not on file   • Number of children: Not on file   • Years of education: Not on file   • Highest education level: Not on file   Tobacco Use   • Smoking status: Never Smoker   • Smokeless tobacco: Never Used   Substance and Sexual Activity   • Alcohol use: No   • Drug use: No   • Sexual activity: Defer       Family History   Problem Relation Age of Onset   • Diabetes Mother    • No Known Problems Father        REVIEW  OF SYSTEMS    CONSTITUTIONAL: Denies fevers, chills, unintentional weight loss or weight gai  RESPIRATORY: Denies chronic cough or sob.   CARDIAC: Denies chest pain, palpitations, edema  GI: Denies dyspepsia, reflux, heartburn, nausea, vomiting, diarrhea or constipation  : Denies dysuria or hematuria.    MUSCULOSKELETAL: Denies muscle weakness and pain   NEURO: Denies chronic headaches.   ENDOCRINE: Denies significant heat or cold intolerance or history of thyroid problems.    DERM: no rashes,lesions or discharge.     Physical Examination  BMI 38.9  GENERAL:alert, well appearing, and in no distress and oriented to person, place, and time  HEENT: normochephalic, atraumatic, no scleral icterus moist mucous membranes.  NECK: Supple there is no thyromegaly or lymphadenopathy  CHEST: clear to auscultation, no wheezes, rales or rhonchi, symmetric air entry  CARDIAC: regular rate and rhythm    EXTREMITIES: no cyanosis, clubbing or edema.  Over the left axilla there is a large soft tissue mass that is slightly tender to palpation.  There is no erythema or signs of infection.  There is evidence of prior scarring from incision and drainage.  No supraclavicular or cervical lymphadenopathy  NEURO: alert and oriented, normal speech, cranial nerves 2-12 grossly intact, no focal deficits .      Assessment:     The patient is a very pleasant 20 y.o. female with a subcutaneous mass located at the left axilla that is consistent with a large sebaceous cyst.  She has some drainage that is expected with a large sebaceous cyst.  I do not see any signs of infection.  She has been taking antibiotics discussed with patient about further step.  Recommend that she undergoes excisional biopsy under anesthesia.  Risk and benefits of the procedure including bleeding, infection, wound complications were discussed in detail with the patient.  She verbalized understanding and agreed with the plan    She understands that she is at increased risk  of complications due to high BMI       Plan:     1.  Excisional biopsy of left axillary mass under anesthesia  2.  Continue taking antibiotics until the time of surgery  3.  COVID-19 testing preop    Williams Ryan MD  General, Minimally Invasive and Endoscopic Surgery  [facility]    [address], [address]  [place], [place], [address]  P: [phone]  F: [phone]

## 2020-09-02 NOTE — H&P (VIEW-ONLY)
CC: Follow-up left axillary cyst      HPI:       Frances Berkowitz is a 20 y.o. female who I have seen in the past for evaluation of infected subcutaneous nodule over the axillary area. The patient has history of incision and drainage of left axillary abscess that was drained by me on March 2019.  On March 2020 I saw her and I recommend that she undergoes excisional biopsy of left axillary soft tissue mass.  She did not schedule surgery because wanted to wait.  2 days ago she noticed small amount of drainage.  She started taking doxycycline that she had at home.  She has not had any drainage since then.  Denies any fevers and chills.  Continues to have discomfort over the left axilla      Past Medical History:   Diagnosis Date   • Diabetes mellitus (CMS/HCC)     Type II       Past Surgical History:   Procedure Laterality Date   • EAR TUBES     • INCISION AND DRAINAGE TRUNK Left 3/20/2019    Procedure: Incisional drainage of left axillary abscess;  Surgeon: Williams Ryan MD;  Location: [facility];  Service: General       Current Outpatient Medications on File Prior to Visit   Medication Sig Dispense Refill   • Dulaglutide (TRULICITY) 0.75 MG/0.5ML solution pen-injector INJECT 1 DOSE SUBCUTANEOUSLY ONCE A WEEK     • metFORMIN (GLUCOPHAGE) 500 MG tablet Take 500 mg by mouth 2 (Two) Times a Day With Meals.       No current facility-administered medications on file prior to visit.        No Known Allergies    Social History     Socioeconomic History   • Marital status: Single     Spouse name: Not on file   • Number of children: Not on file   • Years of education: Not on file   • Highest education level: Not on file   Tobacco Use   • Smoking status: Never Smoker   • Smokeless tobacco: Never Used   Substance and Sexual Activity   • Alcohol use: No   • Drug use: No   • Sexual activity: Defer       Family History   Problem Relation Age of Onset   • Diabetes Mother    • No Known Problems Father        REVIEW  OF SYSTEMS    CONSTITUTIONAL: Denies fevers, chills, unintentional weight loss or weight gai  RESPIRATORY: Denies chronic cough or sob.   CARDIAC: Denies chest pain, palpitations, edema  GI: Denies dyspepsia, reflux, heartburn, nausea, vomiting, diarrhea or constipation  : Denies dysuria or hematuria.    MUSCULOSKELETAL: Denies muscle weakness and pain   NEURO: Denies chronic headaches.   ENDOCRINE: Denies significant heat or cold intolerance or history of thyroid problems.    DERM: no rashes,lesions or discharge.     Physical Examination  BMI 38.9  GENERAL:alert, well appearing, and in no distress and oriented to person, place, and time  HEENT: normochephalic, atraumatic, no scleral icterus moist mucous membranes.  NECK: Supple there is no thyromegaly or lymphadenopathy  CHEST: clear to auscultation, no wheezes, rales or rhonchi, symmetric air entry  CARDIAC: regular rate and rhythm    EXTREMITIES: no cyanosis, clubbing or edema.  Over the left axilla there is a large soft tissue mass that is slightly tender to palpation.  There is no erythema or signs of infection.  There is evidence of prior scarring from incision and drainage.  No supraclavicular or cervical lymphadenopathy  NEURO: alert and oriented, normal speech, cranial nerves 2-12 grossly intact, no focal deficits .      Assessment:     The patient is a very pleasant 20 y.o. female with a subcutaneous mass located at the left axilla that is consistent with a large sebaceous cyst.  She has some drainage that is expected with a large sebaceous cyst.  I do not see any signs of infection.  She has been taking antibiotics discussed with patient about further step.  Recommend that she undergoes excisional biopsy under anesthesia.  Risk and benefits of the procedure including bleeding, infection, wound complications were discussed in detail with the patient.  She verbalized understanding and agreed with the plan    She understands that she is at increased risk  of complications due to high BMI       Plan:     1.  Excisional biopsy of left axillary mass under anesthesia  2.  Continue taking antibiotics until the time of surgery  3.  COVID-19 testing preop    Williams Ryan MD  General, Minimally Invasive and Endoscopic Surgery  [facility]    [address], [address]  [place], [place], [address]  P: [phone]  F: [phone]

## 2020-09-11 ENCOUNTER — LAB (OUTPATIENT)
Dept: LAB | Facility: HOSPITAL | Age: 20
End: 2020-09-11

## 2020-09-11 DIAGNOSIS — Z01.818 OTHER SPECIFIED PRE-OPERATIVE EXAMINATION: ICD-10-CM

## 2020-09-11 PROCEDURE — C9803 HOPD COVID-19 SPEC COLLECT: HCPCS

## 2020-09-11 PROCEDURE — U0004 COV-19 TEST NON-CDC HGH THRU: HCPCS

## 2020-09-12 LAB — SARS-COV-2 RNA RESP QL NAA+PROBE: NOT DETECTED

## 2020-09-14 ENCOUNTER — ANESTHESIA EVENT (OUTPATIENT)
Dept: PERIOP | Facility: HOSPITAL | Age: 20
End: 2020-09-14

## 2020-09-14 ENCOUNTER — HOSPITAL ENCOUNTER (OUTPATIENT)
Facility: HOSPITAL | Age: 20
Setting detail: HOSPITAL OUTPATIENT SURGERY
Discharge: HOME | End: 2020-09-14
Attending: SURGERY | Admitting: SURGERY

## 2020-09-14 ENCOUNTER — ANESTHESIA (OUTPATIENT)
Dept: PERIOP | Facility: HOSPITAL | Age: 20
End: 2020-09-14

## 2020-09-14 VITALS
OXYGEN SATURATION: 99 % | BODY MASS INDEX: 38.57 KG/M2 | DIASTOLIC BLOOD PRESSURE: 95 MMHG | HEIGHT: 66 IN | HEART RATE: 56 BPM | WEIGHT: 240 LBS | TEMPERATURE: 97.2 F | SYSTOLIC BLOOD PRESSURE: 149 MMHG | RESPIRATION RATE: 16 BRPM

## 2020-09-14 DIAGNOSIS — M79.89 SOFT TISSUE MASS: ICD-10-CM

## 2020-09-14 LAB
B-HCG UR QL: NEGATIVE
GLUCOSE BLDC GLUCOMTR-MCNC: 144 MG/DL (ref 70–130)
GLUCOSE BLDC GLUCOMTR-MCNC: 201 MG/DL (ref 70–130)
INTERNAL NEGATIVE CONTROL: NEGATIVE
INTERNAL POSITIVE CONTROL: POSITIVE
Lab: NORMAL

## 2020-09-14 PROCEDURE — 25010000003 CEFAZOLIN IN DEXTROSE 2-4 GM/100ML-% SOLUTION: Performed by: SURGERY

## 2020-09-14 PROCEDURE — 25010000002 PROPOFOL 10 MG/ML EMULSION: Performed by: NURSE ANESTHETIST, CERTIFIED REGISTERED

## 2020-09-14 PROCEDURE — 25010000002 KETOROLAC TROMETHAMINE PER 15 MG: Performed by: NURSE ANESTHETIST, CERTIFIED REGISTERED

## 2020-09-14 PROCEDURE — 82962 GLUCOSE BLOOD TEST: CPT

## 2020-09-14 PROCEDURE — 11406 EXC TR-EXT B9+MARG >4.0 CM: CPT | Performed by: SURGERY

## 2020-09-14 PROCEDURE — 88304 TISSUE EXAM BY PATHOLOGIST: CPT | Performed by: SURGERY

## 2020-09-14 PROCEDURE — 12032 INTMD RPR S/A/T/EXT 2.6-7.5: CPT | Performed by: SURGERY

## 2020-09-14 PROCEDURE — 25010000002 FENTANYL CITRATE (PF) 100 MCG/2ML SOLUTION: Performed by: ANESTHESIOLOGY

## 2020-09-14 PROCEDURE — 25010000002 ONDANSETRON PER 1 MG: Performed by: NURSE ANESTHETIST, CERTIFIED REGISTERED

## 2020-09-14 PROCEDURE — 81025 URINE PREGNANCY TEST: CPT | Performed by: ANESTHESIOLOGY

## 2020-09-14 DEVICE — SEAL HEMO SURG ARISTA/AH ABS/PWDR 1GM: Type: IMPLANTABLE DEVICE | Status: FUNCTIONAL

## 2020-09-14 RX ORDER — LIDOCAINE HYDROCHLORIDE 10 MG/ML
0.5 INJECTION, SOLUTION EPIDURAL; INFILTRATION; INTRACAUDAL; PERINEURAL ONCE AS NEEDED
Status: DISCONTINUED | OUTPATIENT
Start: 2020-09-14 | End: 2020-09-14 | Stop reason: HOSPADM

## 2020-09-14 RX ORDER — HYDROCODONE BITARTRATE AND ACETAMINOPHEN 5; 325 MG/1; MG/1
1 TABLET ORAL ONCE AS NEEDED
Status: CANCELLED | OUTPATIENT
Start: 2020-09-14 | End: 2020-09-21

## 2020-09-14 RX ORDER — SODIUM CHLORIDE 0.9 % (FLUSH) 0.9 %
3-10 SYRINGE (ML) INJECTION AS NEEDED
Status: DISCONTINUED | OUTPATIENT
Start: 2020-09-14 | End: 2020-09-14 | Stop reason: HOSPADM

## 2020-09-14 RX ORDER — FAMOTIDINE 10 MG/ML
20 INJECTION, SOLUTION INTRAVENOUS ONCE
Status: COMPLETED | OUTPATIENT
Start: 2020-09-14 | End: 2020-09-14

## 2020-09-14 RX ORDER — ONDANSETRON 2 MG/ML
INJECTION INTRAMUSCULAR; INTRAVENOUS AS NEEDED
Status: DISCONTINUED | OUTPATIENT
Start: 2020-09-14 | End: 2020-09-14 | Stop reason: SURG

## 2020-09-14 RX ORDER — KETOROLAC TROMETHAMINE 30 MG/ML
INJECTION, SOLUTION INTRAMUSCULAR; INTRAVENOUS AS NEEDED
Status: DISCONTINUED | OUTPATIENT
Start: 2020-09-14 | End: 2020-09-14 | Stop reason: SURG

## 2020-09-14 RX ORDER — AMOXICILLIN 250 MG
2 CAPSULE ORAL DAILY PRN
Qty: 30 TABLET | Refills: 1 | Status: SHIPPED | OUTPATIENT
Start: 2020-09-14 | End: 2020-09-24

## 2020-09-14 RX ORDER — INSULIN DEGLUDEC 100 U/ML
40 INJECTION, SOLUTION SUBCUTANEOUS DAILY
COMMUNITY

## 2020-09-14 RX ORDER — FENTANYL CITRATE 50 UG/ML
50 INJECTION, SOLUTION INTRAMUSCULAR; INTRAVENOUS
Status: DISCONTINUED | OUTPATIENT
Start: 2020-09-14 | End: 2020-09-14 | Stop reason: HOSPADM

## 2020-09-14 RX ORDER — HYDROCODONE BITARTRATE AND ACETAMINOPHEN 7.5; 325 MG/1; MG/1
1 TABLET ORAL EVERY 4 HOURS PRN
Status: DISCONTINUED | OUTPATIENT
Start: 2020-09-14 | End: 2020-09-14 | Stop reason: HOSPADM

## 2020-09-14 RX ORDER — SODIUM CHLORIDE 0.9 % (FLUSH) 0.9 %
3 SYRINGE (ML) INJECTION EVERY 12 HOURS SCHEDULED
Status: DISCONTINUED | OUTPATIENT
Start: 2020-09-14 | End: 2020-09-14 | Stop reason: HOSPADM

## 2020-09-14 RX ORDER — PROPOFOL 10 MG/ML
VIAL (ML) INTRAVENOUS AS NEEDED
Status: DISCONTINUED | OUTPATIENT
Start: 2020-09-14 | End: 2020-09-14 | Stop reason: SURG

## 2020-09-14 RX ORDER — ENALAPRILAT 2.5 MG/2ML
0.62 INJECTION INTRAVENOUS ONCE AS NEEDED
Status: DISCONTINUED | OUTPATIENT
Start: 2020-09-14 | End: 2020-09-14 | Stop reason: HOSPADM

## 2020-09-14 RX ORDER — LIDOCAINE HYDROCHLORIDE 20 MG/ML
INJECTION, SOLUTION INFILTRATION; PERINEURAL AS NEEDED
Status: DISCONTINUED | OUTPATIENT
Start: 2020-09-14 | End: 2020-09-14 | Stop reason: SURG

## 2020-09-14 RX ORDER — MIDAZOLAM HYDROCHLORIDE 1 MG/ML
1 INJECTION INTRAMUSCULAR; INTRAVENOUS
Status: DISCONTINUED | OUTPATIENT
Start: 2020-09-14 | End: 2020-09-14 | Stop reason: HOSPADM

## 2020-09-14 RX ORDER — ACETAMINOPHEN 325 MG/1
650 TABLET ORAL ONCE
Status: COMPLETED | OUTPATIENT
Start: 2020-09-14 | End: 2020-09-14

## 2020-09-14 RX ORDER — GLYCOPYRROLATE 0.2 MG/ML
INJECTION INTRAMUSCULAR; INTRAVENOUS AS NEEDED
Status: DISCONTINUED | OUTPATIENT
Start: 2020-09-14 | End: 2020-09-14 | Stop reason: SURG

## 2020-09-14 RX ORDER — HYDROMORPHONE HYDROCHLORIDE 1 MG/ML
0.5 INJECTION, SOLUTION INTRAMUSCULAR; INTRAVENOUS; SUBCUTANEOUS
Status: DISCONTINUED | OUTPATIENT
Start: 2020-09-14 | End: 2020-09-14 | Stop reason: HOSPADM

## 2020-09-14 RX ORDER — ONDANSETRON 2 MG/ML
4 INJECTION INTRAMUSCULAR; INTRAVENOUS ONCE AS NEEDED
Status: DISCONTINUED | OUTPATIENT
Start: 2020-09-14 | End: 2020-09-14 | Stop reason: HOSPADM

## 2020-09-14 RX ORDER — MAGNESIUM HYDROXIDE 1200 MG/15ML
LIQUID ORAL AS NEEDED
Status: DISCONTINUED | OUTPATIENT
Start: 2020-09-14 | End: 2020-09-14 | Stop reason: HOSPADM

## 2020-09-14 RX ORDER — PYRAZINAMIDE 500 MG/1
1 TABLET ORAL EVERY 6 HOURS PRN
Qty: 20 TABLET | Refills: 0 | Status: SHIPPED | OUTPATIENT
Start: 2020-09-14 | End: 2020-09-24

## 2020-09-14 RX ORDER — BUPIVACAINE HYDROCHLORIDE AND EPINEPHRINE 5; 5 MG/ML; UG/ML
INJECTION, SOLUTION PERINEURAL AS NEEDED
Status: DISCONTINUED | OUTPATIENT
Start: 2020-09-14 | End: 2020-09-14 | Stop reason: HOSPADM

## 2020-09-14 RX ORDER — SCOLOPAMINE TRANSDERMAL SYSTEM 1 MG/1
1 PATCH, EXTENDED RELEASE TRANSDERMAL ONCE
Status: DISCONTINUED | OUTPATIENT
Start: 2020-09-14 | End: 2020-09-14 | Stop reason: HOSPADM

## 2020-09-14 RX ORDER — CEFAZOLIN SODIUM 2 G/100ML
2 INJECTION, SOLUTION INTRAVENOUS ONCE
Status: COMPLETED | OUTPATIENT
Start: 2020-09-14 | End: 2020-09-14

## 2020-09-14 RX ORDER — CELECOXIB 200 MG/1
200 CAPSULE ORAL ONCE
Status: COMPLETED | OUTPATIENT
Start: 2020-09-14 | End: 2020-09-14

## 2020-09-14 RX ORDER — PROMETHAZINE HYDROCHLORIDE 12.5 MG/1
12.5 TABLET ORAL EVERY 6 HOURS PRN
Qty: 10 TABLET | Refills: 0 | Status: SHIPPED | OUTPATIENT
Start: 2020-09-14 | End: 2020-09-24

## 2020-09-14 RX ORDER — SODIUM CHLORIDE 0.9 % (FLUSH) 0.9 %
10 SYRINGE (ML) INJECTION AS NEEDED
Status: DISCONTINUED | OUTPATIENT
Start: 2020-09-14 | End: 2020-09-14 | Stop reason: HOSPADM

## 2020-09-14 RX ORDER — SODIUM CHLORIDE, SODIUM LACTATE, POTASSIUM CHLORIDE, CALCIUM CHLORIDE 600; 310; 30; 20 MG/100ML; MG/100ML; MG/100ML; MG/100ML
9 INJECTION, SOLUTION INTRAVENOUS CONTINUOUS
Status: DISCONTINUED | OUTPATIENT
Start: 2020-09-14 | End: 2020-09-14 | Stop reason: HOSPADM

## 2020-09-14 RX ADMIN — CELECOXIB 200 MG: 200 CAPSULE ORAL at 08:37

## 2020-09-14 RX ADMIN — ONDANSETRON HYDROCHLORIDE 4 MG: 2 SOLUTION INTRAMUSCULAR; INTRAVENOUS at 10:20

## 2020-09-14 RX ADMIN — FENTANYL CITRATE 100 MCG: 50 INJECTION INTRAMUSCULAR; INTRAVENOUS at 10:11

## 2020-09-14 RX ADMIN — SODIUM CHLORIDE, POTASSIUM CHLORIDE, SODIUM LACTATE AND CALCIUM CHLORIDE 9 ML/HR: 600; 310; 30; 20 INJECTION, SOLUTION INTRAVENOUS at 08:36

## 2020-09-14 RX ADMIN — FAMOTIDINE 20 MG: 10 INJECTION INTRAVENOUS at 08:39

## 2020-09-14 RX ADMIN — PROPOFOL 250 MG: 10 INJECTION, EMULSION INTRAVENOUS at 10:13

## 2020-09-14 RX ADMIN — KETOROLAC TROMETHAMINE 30 MG: 30 INJECTION, SOLUTION INTRAMUSCULAR; INTRAVENOUS at 10:20

## 2020-09-14 RX ADMIN — LIDOCAINE HYDROCHLORIDE 40 MG: 20 INJECTION, SOLUTION INFILTRATION; PERINEURAL at 10:13

## 2020-09-14 RX ADMIN — CEFAZOLIN SODIUM 2 G: 2 INJECTION, SOLUTION INTRAVENOUS at 10:13

## 2020-09-14 RX ADMIN — GLYCOPYRROLATE 0.2 MG: 0.2 INJECTION INTRAMUSCULAR; INTRAVENOUS at 10:13

## 2020-09-14 RX ADMIN — ACETAMINOPHEN 650 MG: 325 TABLET ORAL at 08:37

## 2020-09-14 RX ADMIN — SCOPALAMINE 1 PATCH: 1 PATCH, EXTENDED RELEASE TRANSDERMAL at 08:38

## 2020-09-14 NOTE — ANESTHESIA POSTPROCEDURE EVALUATION
"Patient: Frances ANAYA Overall    Procedure Summary     Date: 09/14/20 Room / Location:  [facility]  [facility]    Anesthesia Start: 1007 Anesthesia Stop: 1118    Procedure: excisional biopsy of left axillary soft tissue mass (Left Breast) Diagnosis:       Soft tissue mass      (Soft tissue mass [M79.89])    Surgeon: Williams Ryan MD Provider: Rosalino Smith MD    Anesthesia Type: general ASA Status: 3          Anesthesia Type: general    Vitals  Vitals Value Taken Time   /115 09/14/20 1202   Temp 36.2 °C (97.2 °F) 09/14/20 1200   Pulse 76 09/14/20 1203   Resp 16 09/14/20 1200   SpO2 100 % 09/14/20 1203   Vitals shown include unvalidated device data.        Post Anesthesia Care and Evaluation    Patient location during evaluation: PHASE II  Patient participation: complete - patient participated  Level of consciousness: awake  Pain management: adequate  Airway patency: patent  Anesthetic complications: No anesthetic complications    Cardiovascular status: acceptable  Respiratory status: acceptable  Hydration status: acceptable    Comments: /95   Pulse 56   Temp 36.2 °C (97.2 °F) (Temporal)   Resp 16   Ht 167.6 cm (65.98\")   Wt 109 kg (240 lb)   LMP 09/13/2020   SpO2 99%   BMI 38.76 kg/m²         "

## 2020-09-14 NOTE — BRIEF OP NOTE
BREAST BIOPSY  Progress Note    Frances ANAYA Overall  9/14/2020    Pre-op Diagnosis:   Soft tissue mass [M79.89]       Post-Op Diagnosis Codes:     * Soft tissue mass [M79.89]    Procedure/CPT® Codes:        Procedure(s):  excisional biopsy of left axillary soft tissue mass 5 x 5r cm    Surgeon(s):  Williams Ryan MD    Anesthesia: General    Staff:   Circulator: Mary Buenrostro RN  Scrub Person: Natalia Moreno  Assistant: Briseida Rivas CSA  Assistant: Briseida Rivas CSA      Estimated Blood Loss: minimal    Urine Voided: * No values recorded between 9/14/2020 10:08 AM and 9/14/2020 11:01 AM *    Specimens:                Specimens     ID Source Type Tests Collected By Collected At Frozen?      A Axilla, Left Tissue · TISSUE PATHOLOGY EXAM   Williams Ryan MD 9/14/20 1058      Description: soft tissue mass left axilla     This specimen was not marked as sent.                Drains:   Open Drain Left;Proximal;Superior Other (Comment) (Active)       Findings: Large cystic subcutaneous mass 5 x 5 cm    Complications: None    Assistant: Briseida Rivas CSA  was responsible for performing the following activities: Retraction, Suction, Irrigation, Suturing, Closing and Placing Dressing and their skilled assistance was necessary for the success of this case.    Williams Ryan MD     Date: 9/14/2020  Time: 11:01 EDT

## 2020-09-14 NOTE — OP NOTE
PREOPERATIVE DIAGNOSES:    (1) Soft tissue mass left axilla     POSTOPERATIVE DIAGNOSES:    (1) Soft tissue mass left axilla of 5 x 5 cm consistent with a ruptured sebaceous cyst     PROCEDURE:    (1) Excision of soft tissue mass 5 x 5 cm  (2) Layered closure    SURGEON/STAFF:  PATT Ryan   ASSISTANT:  JULIANN Verdugo FAS     NEEDLE AND SPONGE COUNT:  Correct.  SPECIMEN:  5 X 5 sebaceous cyst capsule and subcutaneous fat   INTRAOPERATIVE COMPLICATIONS:  None.   Andesthesia: General    INDICATIONS: 20-year-old female with history of infected sebaceous cyst s/p incision and drainage.  She healed well from the incision and drainage and has been having recurrent episodes of swelling.  I discussed with the patient about treatment options.  I think she has a large sebaceous cyst that drains when fluid accumulates and I think should be resected to prevent further episode.  Risk and benefits of procedure including bleeding, infection were discussed in detail with the patient.verbalized understanding and agreed to the plan      OPERATION:  The patient was brought to the operative room in stable condition.  She was placed supine in the operative room table.  Prep and drape was performed in the usual sterile fashion.  Timeout was performed and the patient was correctly identified.  The patient left arm was outstretched 90 degree from her body.  I then performed an elliptical incision over her prior left axillary scar.  The incision was 5 x 5 cm.  Dissection was carried down through the subcutaneous tissue.  There was evidence of a large cystic cavity at the axilla that was dissected from the subcutaneous tissue to the cystic cavity measuring approximately 5 x 5 cm and it was completely removed from the axilla.  The wound was irrigated.  A 19 Nigerien Sandoval drain was placed at the surgical bed and brought through separate skin incision inferiorly.  The drain was sutured in place with 3-0 nylon.  I then placed  Geoffrey hemostatic agent.  The wound was closed in 3 layers with interrupted 3-0 nylon to close all the dead space.  The skin was closed with a running 4-0 Monocryl, covered with Steri-Strips, 4 x 4 and Tegaderms.  The patient tolerated procedure well and was sent to recovery area in stable condition.  Instrument and sponge count were correct    Williams Ryan MD, FACS  General, Minimally Invasive and Endoscopic Surgery  [facility]    [address] [place] [address], [address]  [place], [place], [address]  P: [phone]  F: [phone]

## 2020-09-14 NOTE — ANESTHESIA PREPROCEDURE EVALUATION
Anesthesia Evaluation     Patient summary reviewed and Nursing notes reviewed   NPO Solid Status: > 8 hours  NPO Liquid Status: > 2 hours           Airway   Mallampati: II  TM distance: >3 FB  Neck ROM: full  Dental - normal exam     Pulmonary - negative pulmonary ROS and normal exam    breath sounds clear to auscultation  Cardiovascular - negative cardio ROS and normal exam    Rhythm: regular  Rate: normal    (-) angina, orthopnea, PND, LIRIANO      Neuro/Psych- negative ROS  GI/Hepatic/Renal/Endo    (+) obesity,   diabetes mellitus type 2,     Musculoskeletal (-) negative ROS    Abdominal    Substance History - negative use     OB/GYN negative ob/gyn ROS         Other - negative ROS                       Anesthesia Plan    ASA 3     general     intravenous induction     Anesthetic plan, all risks, benefits, and alternatives have been provided, discussed and informed consent has been obtained with: patient.

## 2020-09-14 NOTE — ANESTHESIA PROCEDURE NOTES
Airway  Urgency: elective    Date/Time: 9/14/2020 10:16 AM  Airway not difficult    General Information and Staff    Patient location during procedure: OR  Anesthesiologist: Rosalino Smith MD  CRNA: Khushboo Gomez CRNA    Indications and Patient Condition  Indications for airway management: airway protection    Preoxygenated: yes  Mask difficulty assessment: 1 - vent by mask    Final Airway Details  Final airway type: supraglottic airway      Successful airway: unique  Size 4    Number of attempts at approach: 1  Assessment: lips, teeth, and gum same as pre-op    Additional Comments  Smooth IV/mask induction and placement of LMA. Atraumatic, lips/teeth/mouth intact, as preop. +ETCO2, bilateral breath sounds and equal.

## 2020-09-15 LAB
LAB AP CASE REPORT: NORMAL
LAB AP DIAGNOSIS COMMENT: NORMAL
PATH REPORT.FINAL DX SPEC: NORMAL
PATH REPORT.GROSS SPEC: NORMAL

## 2020-09-24 ENCOUNTER — OFFICE VISIT (OUTPATIENT)
Dept: SURGERY | Facility: CLINIC | Age: 20
End: 2020-09-24

## 2020-09-24 DIAGNOSIS — Z51.89 VISIT FOR WOUND CHECK: Primary | ICD-10-CM

## 2020-09-24 DIAGNOSIS — Z09 POSTOP CHECK: ICD-10-CM

## 2020-09-24 PROCEDURE — 99024 POSTOP FOLLOW-UP VISIT: CPT | Performed by: SURGERY

## 2020-09-25 NOTE — PROGRESS NOTES
CC: Follow up excisional biopsy of left axillary cyst    S: This is a 20 y.o. female who presents for a post-operative visit after undergoing a excisional biopsy of large left axillary cyst on 9/14/2020.  She is doing great and denies any complaint.  She has been having minimal serous drainage from the drain.  Denies any fever chills or wound problems    Pathology report:   Final Diagnosis   1. Skin and Soft Tissue, Left Axilla, Excision:  Benign skin and underlying       connective tissue with                A. Extensive acute and chronic inflammation and numerous histiocytes       surrounding a cystic area.     SEE COMMENT     Francisco/kds   Electronically signed by Mal De Jesus MD on 9/15/2020 at 1403   Comment    There is inflammation surrounding the cystic area with a few areas of what appears to be entrapped squamous epithelial cells which would entirely suggest as ruptured epidermal inclusion cyst with inflammation.       O:   Incisions are healing well without any erythema or signs of infection.  Serous drainage from Sandoval drain in the left axilla    Assessment and plan:     The patient is a very pleasant 20 y.o. female s/p excision of benign left axillary cyst.  The wound is healing great.  Drain was removed and dressings were placed.    I advised the patient to continue to refrain from doing any strenuous exercise for 2 weeks after surgery.    She can remove dressing in 3 days  Steri-Strips will fall alone  Can shower after drain dressing is removed    Patient was given time to ask questions and all of them were answered appropriately.  The patient verbalized understanding and agreed with the plan.    she will follow-up at our office on a prn basis unless there are any problems.

## 2021-04-16 ENCOUNTER — BULK ORDERING (OUTPATIENT)
Dept: CASE MANAGEMENT | Facility: OTHER | Age: 21
End: 2021-04-16

## 2021-04-16 DIAGNOSIS — Z23 IMMUNIZATION DUE: ICD-10-CM

## 2022-02-01 ENCOUNTER — OFFICE VISIT (OUTPATIENT)
Dept: SURGERY | Facility: CLINIC | Age: 22
End: 2022-02-01

## 2022-02-01 VITALS — HEIGHT: 66 IN | WEIGHT: 242 LBS | BODY MASS INDEX: 38.89 KG/M2

## 2022-02-01 DIAGNOSIS — L02.411 ABSCESS OF RIGHT AXILLA: Primary | ICD-10-CM

## 2022-02-01 PROCEDURE — 10060 I&D ABSCESS SIMPLE/SINGLE: CPT | Performed by: SURGERY

## 2022-02-01 PROCEDURE — 87205 SMEAR GRAM STAIN: CPT | Performed by: SURGERY

## 2022-02-01 PROCEDURE — 87070 CULTURE OTHR SPECIMN AEROBIC: CPT | Performed by: SURGERY

## 2022-02-01 PROCEDURE — 87186 SC STD MICRODIL/AGAR DIL: CPT | Performed by: SURGERY

## 2022-02-01 PROCEDURE — 87077 CULTURE AEROBIC IDENTIFY: CPT | Performed by: SURGERY

## 2022-02-01 RX ORDER — LIRAGLUTIDE 6 MG/ML
INJECTION SUBCUTANEOUS
COMMUNITY
Start: 2021-12-13

## 2022-02-01 RX ORDER — DOXYCYCLINE HYCLATE 100 MG/1
100 CAPSULE ORAL 2 TIMES DAILY
COMMUNITY
Start: 2022-01-28 | End: 2022-02-04

## 2022-02-02 NOTE — PROGRESS NOTES
"CC: Right axillary abscess     HPI: Patient is a very pleasant 21-year-old female that is here today for evaluation of right axillary cyst.  Patient has history of left axillary sebaceous cyst that was infected and drain in 2019.  She has poorly controlled diabetes and she has not been taking her diabetic medication.  She has been noticing swelling and pain over the right axilla.  She denies any fevers or chills.  She has been placed on antibiotics.  Denies any other symptoms     PMH: Type 2 diabetes, left axillary sebaceous cyst     PSH:   -Incision and drainage of left complex axillary abscess 3/20/2019  -Excisional biopsy of left axillary soft tissue mass 9/14/2020    MEDS: Reviewed and reconciled in epic.  Taking doxycycline     ALL: No known drug allergies    FH and SH: No family history of high rhinitis.  Denies smoking drinking or taking any drugs     ROS:   Constitutional: denies any weight changes, fatigue or weakness.  HEENT: Denies hearing loss and rhinorrhea  Cardiovascular: denies chest pain, palpitations, edemas.  Respiratory: denies cough, sputum, SOB.  Gastrointestinal: denies N&V, abd pain, diarrhea, constipation.  Genitourinary: denies dysuria, frequency.  Endocrine: denies cold intolerance, lethargy and flushing.  Hem: denies excessive bruising and postop bleeding.  Musculoskeletal: denies weakness, joint swelling, pain or stiffness.  Neuro: denies seizures, CVA, paresthesia, or peripheral neuropathy.   Skin: denies change in nevi, rashes, masses.     PE:   Vitals:    02/01/22 1539   Weight: 110 kg (242 lb)   Height: 167.6 cm (66\")     Body mass index is 39.06 kg/m².   Alert and oriented ×3, no acute distress.  Head is normocephalic and atraumatic.  Neck is supple  Breathing comfortable  Over the right axilla there is area of soft tissue fluctuation, erythema and tenderness that is approximately 5 x 4 cm.  There is no drainage.    Diagnostic studies: None     Assessment and plan    The patient is " a very pleasant 21-year-old female, poorly controlled diabetes.  Right axillary abscess.  Discussed with patient about further step.  Recommend that she undergoes incision and drainage under local sedation versus general anesthesia.  Risk and benefits of procedure including bleeding, infection, wound complications discussed in detail with the patient that verbalized understanding.  She decided to undergo incision and drainage under local anesthesia at the procedure room.    PREOPERATIVE DIAGNOSES:    (1) right axillary abscess    POSTOPERATIVE DIAGNOSES:    (1) Same    PROCEDURE:    (1) Incisional drainage of right axillary abscess    SURGEON/STAFF:  PATT Ryan   SPECIMEN:  Culture swap  INTRAOPERATIVE COMPLICATIONS:  None.   Andesthesia: Local    OPERATION:  At the procedure room and after verbal informed consent was obtained from the patient she was placed supine in the procedure room table.    Next the skin overlying the abscess was injected with 2% Xylocaine with epinephrine.  An elliptical incision of 3 x 3 cm was made with scalpel and dissection continued down to the subcutaneous tissue.  Large amount of purulent fluid was drained. This was swapped and sent for culture. Loculation were broken bluntly and the abscess completely drained. The cavity was irrigated with saline and packed with half inch iodoform gauze. The wound was covered with 4x4 gauze and tape.       The patient tolerated the procedure well    Patient instructed to remove packing in 24 hours.  Should clean the wound with soap and water at least twice a days and keep a dry dressing since she does not think will be able to perform wet-to-dry dressing changes.  Continue antibiotics  Follow-up in my office in 1 week for wound check       Williams Ryan MD  General, Minimally Invasive and Endoscopic Surgery  [facility]    [address] [place] [address], [address]  [place], [place], [facility]  P: [phone]  F: [phone]

## 2022-02-04 LAB
BACTERIA SPEC AEROBE CULT: ABNORMAL
GRAM STN SPEC: ABNORMAL
GRAM STN SPEC: ABNORMAL

## 2022-02-04 RX ORDER — SULFAMETHOXAZOLE AND TRIMETHOPRIM 800; 160 MG/1; MG/1
1 TABLET ORAL 2 TIMES DAILY
Qty: 10 TABLET | Refills: 0 | Status: SHIPPED | OUTPATIENT
Start: 2022-02-04 | End: 2022-02-09

## 2022-02-07 ENCOUNTER — TELEPHONE (OUTPATIENT)
Dept: SURGERY | Facility: CLINIC | Age: 22
End: 2022-02-07

## 2022-02-07 NOTE — TELEPHONE ENCOUNTER
----- Message from Williams Ryan MD sent at 2/4/2022  1:06 PM EST -----  Please call pt and let her know she needs to take bactrim for 5 days, [facility] pharmacy[facility]

## 2022-02-07 NOTE — PROGRESS NOTES
CC: Follow-up after right axillary abscess drainage    S: Patient is a very pleasant 21-year-old female with poorly controlled diabetes that presented a week ago to my office with right axillary swelling.  She was found to have a large abscess and underwent incision and drainage.    O:   Alert, no acute distress  Right axillary wound is open, there is no drainage or signs of infection    Assessment and plan    21-year-old female s/p incision and drainage of right axillary abscess, poorly controlled diabetes.  Her wound is looking good and healing without any problem.  Minimal discomfort at the area.    Discussed with her about the need to continue washing the wound with soap and water at least twice a day and placing a dry dressing.  She understood    Follow-up in my office in 2 weeks    Williams Ryan MD, FACS  General, Minimally Invasive and Endoscopic Surgery  [facility]    [address] [place] [address], [address]  [place], [place], [address]  P: [phone]  F: [phone]

## 2022-02-07 NOTE — TELEPHONE ENCOUNTER
Attempted to call patient but phone number is out of service. Called patient's mother per verbal release and informed that Dr. Ryan sent abx to pharmacy. She states they picked up the prescription on Friday and pt has started taking it. Her mother states pt has a new phone number which I updated in our system.

## 2022-02-08 ENCOUNTER — OFFICE VISIT (OUTPATIENT)
Dept: SURGERY | Facility: CLINIC | Age: 22
End: 2022-02-08

## 2022-02-08 DIAGNOSIS — Z51.89 VISIT FOR WOUND CHECK: Primary | ICD-10-CM

## 2022-02-08 PROCEDURE — 99024 POSTOP FOLLOW-UP VISIT: CPT | Performed by: SURGERY

## 2022-02-21 NOTE — PROGRESS NOTES
CC: Follow-up after right axillary abscess drainage     S: Patient is a very pleasant 21-year-old female with poorly controlled diabetes that presented a week ago to my office with right axillary swelling.  She was found to have a large abscess and underwent incision and drainage.     O:   Alert, no acute distress  Right axillary wound is open, it measures 1 x 1 cm and half centimeter deep.  There is granulation tissue at the base there is no drainage or signs of infection     Assessment and plan     21-year-old female s/p incision and drainage of right axillary abscess, poorly controlled diabetes.  Her wound is looking good and healing without any problem.     Discussed with patient about the need to apply antibiotic ointment at least twice a day and keep a dry dressing over.  Follow-up in my office as needed.      Recommend adequate blood glucose control to prevent complications from diabetes in the future.  She understood    Williams Ryan MD, FACS  General, Minimally Invasive and Endoscopic Surgery  [facility]    [address], [address]  [place], [place], [address]  P: [phone]  F: [phone]

## 2022-02-22 ENCOUNTER — OFFICE VISIT (OUTPATIENT)
Dept: SURGERY | Facility: CLINIC | Age: 22
End: 2022-02-22

## 2022-02-22 DIAGNOSIS — Z51.89 VISIT FOR WOUND CHECK: Primary | ICD-10-CM

## 2022-02-22 PROCEDURE — 99212 OFFICE O/P EST SF 10 MIN: CPT | Performed by: SURGERY

## 2022-12-27 ENCOUNTER — OFFICE VISIT (OUTPATIENT)
Dept: SURGERY | Facility: CLINIC | Age: 22
End: 2022-12-27

## 2022-12-27 VITALS — HEIGHT: 66 IN | WEIGHT: 242 LBS | BODY MASS INDEX: 38.89 KG/M2

## 2022-12-27 DIAGNOSIS — L02.412 ABSCESS OF LEFT AXILLA: Primary | ICD-10-CM

## 2022-12-27 PROCEDURE — 87070 CULTURE OTHR SPECIMN AEROBIC: CPT | Performed by: SURGERY

## 2022-12-27 PROCEDURE — 87186 SC STD MICRODIL/AGAR DIL: CPT | Performed by: SURGERY

## 2022-12-27 PROCEDURE — 87205 SMEAR GRAM STAIN: CPT | Performed by: SURGERY

## 2022-12-27 PROCEDURE — 10060 I&D ABSCESS SIMPLE/SINGLE: CPT | Performed by: SURGERY

## 2022-12-27 RX ORDER — SULFAMETHOXAZOLE AND TRIMETHOPRIM 800; 160 MG/1; MG/1
1 TABLET ORAL 2 TIMES DAILY
Qty: 10 TABLET | Refills: 0 | Status: SHIPPED | OUTPATIENT
Start: 2022-12-27 | End: 2023-01-01

## 2022-12-27 NOTE — PROGRESS NOTES
Chief Complaint   Patient presents with   • Follow-up       HPI:       Frances Berkowitz is a 22 y.o. female who presents for evaluation of a subcutaneous nodule over the left axilla.   Patient reports swelling over the left axilla that has been going on now for several weeks.  She saw a dermatologist that injected steroids over the area.  She reports progressive swelling.  She has history of axillary abscess over the right axilla as well as incision and drainage of left axillary abscess.  She denies any fevers or chills.  She has not been on any antibiotics.    Past Medical History:   Diagnosis Date   • Abscess of axilla, left    • Diabetes mellitus (HCC)     Type II       Past Surgical History:   Procedure Laterality Date   • BREAST BIOPSY Left 9/14/2020    Procedure: excisional biopsy of left axillary soft tissue mass;  Surgeon: Williams Ryan MD;  Location: [facility];  Service: General;  Laterality: Left;   • EAR TUBES     • INCISION AND DRAINAGE ABSCESS Right 02/01/2022    IN-OFFICE PROCEDURE: Incisional drainage of Right Axillary Abscess - Dr. Williams Ryan   • INCISION AND DRAINAGE TRUNK Left 3/20/2019    Procedure: Incisional drainage of left axillary abscess;  Surgeon: Williams Ryan MD;  Location: [facility];  Service: General       Current Outpatient Medications on File Prior to Visit   Medication Sig Dispense Refill   • Victoza 18 MG/3ML solution pen-injector injection ADMINISTER 1.8 MG UNDER THE SKIN DAILY AS DIRECTED     • Insulin Degludec (TRESIBA) 100 UNIT/ML solution injection Inject 40 Units under the skin into the appropriate area as directed Daily.       No current facility-administered medications on file prior to visit.       No Known Allergies    Social History     Socioeconomic History   • Marital status: Single   Tobacco Use   • Smoking status: Never   • Smokeless tobacco: Never   Vaping Use   • Vaping Use: Never used   Substance and Sexual Activity   •  "Alcohol use: Not Currently     Comment: occasionally    • Drug use: Not Currently     Types: Marijuana     Comment: occasionally    • Sexual activity: Defer       Family History   Problem Relation Age of Onset   • Diabetes Mother    • Hypertension Mother    • Hypertension Father    • Malig Hyperthermia Neg Hx        REVIEW OF SYSTEMS    CONSTITUTIONAL: Denies fevers, chills, unintentional weight loss or weight gain  RESPIRATORY: Denies chronic cough or sob.   CARDIAC: Denies chest pain, palpitations, edema  GI: Denies dyspepsia, reflux, heartburn, nausea, vomiting, diarrhea or constipation   : Denies dysuria or hematuria.    MUSCULOSKELETAL: Denies muscle weakness and pain   NEURO: Denies chronic headaches.   ENDOCRINE: Denies significant heat or cold intolerance or history of thyroid problems.    DERM: no rashes,lesions or discharge.     Physical Examination  Ht 167.6 cm (66\")   Wt 110 kg (242 lb)   BMI 39.06 kg/m²   Body mass index is 39.06 kg/m².  GENERAL:alert, well appearing, and in no distress and oriented to person, place, and time  HEENT: normochephalic, atraumatic, no scleral icterus moist mucous membranes.  NECK: Supple  CHEST: Breathing comfortable.  Over the left axilla there is a subcutaneous mass with swelling, erythema, tenderness.  There is no drainage.  There is scar from prior excision over the left as well as the right axilla*   NEURO: alert and oriented, normal speech, cranial nerves 2-12 grossly intact, no focal deficits     Assessment:     The patient is a very pleasant 22 y.o. female with subcutaneous abscess located over the left axilla.  Patient with diabetes.  Does not follow a strict diet and does not control her blood glucose level very often.  I aspirated the area in the office and drained approximately 20 cc of purulent fluid.  Discussed with the patient about further step.  Recommend that she undergoes incision and drainage of left axillary abscess.  Risk and benefits of procedure " including bleeding, wound complications discussed in detail with the patient that verbalized understanding and agree with the plan       Plan:     Incision and drainage of left axillary abscess  Consented    Procedure:   Patient was taken to procedure room and was placed in the procedure room supine.  Consent was signed by the patient.  Preoperative timeout was performed and the patient was correctly identified.  The left axillary area was then prepped and draped in usual sterile fashion.  2% lidocaine with epinephrine was injected over the area.  With scalpel a 2 x 2 cm elliptical incision was performed.  Dissection was carried down to subcutaneous tissue.  Large amount of purulent fluid was drained.  The fluid was swabbed and sent for culture analysis.  Loculations were broken bluntly.  The abscess was completely drained.  The abscess cavity was packed with quarter inch iodoform gauze.  The wound was covered with 4 x 4 and Tegaderm.  The patient tolerated procedure well and she was sent home in stable condition.    Patient to remove packing tomorrow, clean the wound with soap and water and keep a dry dressing over  Bactrim given for 5 days  Patient to follow-up in my office in 1 week  Over-the-counter p.o. pain meds    She understood    Williams Ryan MD  General, Minimally Invasive and Endoscopic Surgery  [facility]    [address], [address]  [place], [place], [address]  P: [phone]  F: [phone]

## 2022-12-31 LAB
BACTERIA SPEC AEROBE CULT: ABNORMAL
BACTERIA SPEC AEROBE CULT: ABNORMAL
GRAM STN SPEC: ABNORMAL

## 2023-01-03 ENCOUNTER — OFFICE VISIT (OUTPATIENT)
Dept: SURGERY | Facility: CLINIC | Age: 23
End: 2023-01-03
Payer: COMMERCIAL

## 2023-01-03 VITALS — HEIGHT: 66 IN | BODY MASS INDEX: 38.89 KG/M2 | WEIGHT: 242 LBS

## 2023-01-03 DIAGNOSIS — Z51.89 VISIT FOR WOUND CHECK: Primary | ICD-10-CM

## 2023-01-03 DIAGNOSIS — Z09 POSTOP CHECK: ICD-10-CM

## 2023-01-03 PROCEDURE — 99212 OFFICE O/P EST SF 10 MIN: CPT | Performed by: SURGERY

## 2023-01-03 RX ORDER — TRIAMCINOLONE ACETONIDE 1 MG/G
1 CREAM TOPICAL 2 TIMES DAILY
COMMUNITY
Start: 2022-12-27

## 2023-01-03 NOTE — PROGRESS NOTES
CC: Follow-up after incision and drainage of left axillary abscess    S: Patient is here today for follow-up after undergoing incision and drainage of left axillary abscess.  Denies any complaint.  Has been cleaning the wound with soap and water 3 times a day.  Complete antibiotics    O:   Alert, no acute distress  Breathing comfortable  Left axillary wound that is 1.5 cm.  Clean tissue at the base.  No signs of infection    Cultures:  Wound Culture Heavy growth (4+) Escherichia coli Abnormal        Heavy growth (4+) Enterococcus raffinosus Abnormal     This organism is of low virulence, intrinsically resistant to Vancomycin and is a member of the skin and oral pharyngeal hayes. Infections are rare and they are often found in mixed cultures.             Gram Stain  Many (4+) WBCs per low power field      Many (4+) Gram positive cocci      Occasional Gram negative bacilli              Resulting Agency: [facility]     Susceptibility     Escherichia coli     BHARAT     Ampicillin <=2 ug/ml Susceptible     Ampicillin + Sulbactam <=2 ug/ml Susceptible     Cefepime <=1 ug/ml Susceptible     Ceftazidime <=1 ug/ml Susceptible     Ceftriaxone <=1 ug/ml Susceptible     Gentamicin <=1 ug/ml Susceptible     Levofloxacin <=0.12 ug/ml Susceptible     Piperacillin + Tazobactam <=4 ug/ml Susceptible     Tetracycline <=1 ug/ml Susceptible     Trimethoprim + Sulfamethoxazole <=20 ug/ml Susceptible                  Assessment and plan    22-year-old female s/p incision and drainage of left axillary abscess, pathology grew E. coli as well as Enterococcus that usually are members of the skin hayes as well as the GI tract.  Patient is diabetic.  She does not check her blood glucose often.  Wound is healing well.  Discussed with the patient about the need to cleaning the wound with soap and water to 3 times a day until completely healed.  Discussed with her about the need to check the blood sugars more often, make sure she cleans her hands  very well after going to the bathroom.    She will follow-up in my office in 2 weeks.    Williams Ryan MD, FACS  General, Minimally Invasive and Endoscopic Surgery  [facility]    [address], [address]  [place], [place], [address]  P: [phone]  F: [phone]

## 2023-01-17 ENCOUNTER — OFFICE VISIT (OUTPATIENT)
Dept: SURGERY | Facility: CLINIC | Age: 23
End: 2023-01-17
Payer: COMMERCIAL

## 2023-01-17 DIAGNOSIS — Z51.89 VISIT FOR WOUND CHECK: Primary | ICD-10-CM

## 2023-01-17 DIAGNOSIS — Z09 POSTOP CHECK: ICD-10-CM

## 2023-01-17 PROCEDURE — 99212 OFFICE O/P EST SF 10 MIN: CPT | Performed by: SURGERY

## 2024-03-13 NOTE — PROGRESS NOTES
CC: Follow-up after incision and drainage of left axillary abscess    S: Patient is here today for follow-up.  She is doing great and denies any complaint.  Has not have any drainage from her left axillary wound    O:   Alert, no acute distress  Breathing comfortable  Left axillary wound there is less than 1 cm and less than 1 cm deep.  There is no evidence of infection or erythema surrounding the wound    Assessment and plan    22-year-old female s/p incision and drainage of left axillary abscess.  Wound is healing without any problem.  Discussed with the patient about the need to apply antibiotic ointment over the wound until the wound completely healed.  She understands about the need to take care of her diabetes so to prevent recurrence.  She understood    Follow-up in my office.    
done

## (undated) DEVICE — SUT VIC 2/0 SH 27IN

## (undated) DEVICE — APPL CHLORAPREP W/TINT 26ML ORNG

## (undated) DEVICE — ANTIBACTERIAL UNDYED BRAIDED (POLYGLACTIN 910), SYNTHETIC ABSORBABLE SUTURE: Brand: COATED VICRYL

## (undated) DEVICE — NDL HYPO ECLPS SFTY 22G 1 1/2IN

## (undated) DEVICE — SUT MNCRYL 4/0 PS2 18 IN

## (undated) DEVICE — APPL CHLORAPREP HI/LITE 26ML ORNG

## (undated) DEVICE — STPLR SKIN VISISTAT WD 35CT

## (undated) DEVICE — 3M™ STERI-STRIP™ COMPOUND BENZOIN TINCTURE 40 BAGS/CARTON 4 CARTONS/CASE C1544: Brand: 3M™ STERI-STRIP™

## (undated) DEVICE — SPNG GZ WOVN 4X4IN 12PLY 10/BX STRL

## (undated) DEVICE — GLV SURG BIOGEL LTX PF 7 1/2

## (undated) DEVICE — JACKSON-PRATT 100CC BULB RESERVOIR: Brand: CARDINAL HEALTH

## (undated) DEVICE — SPNG LAP 18X18IN LF STRL PK/5

## (undated) DEVICE — PK PROC MINOR TOWER 40

## (undated) DEVICE — DRSNG SURESITE WNDW 4X4.5

## (undated) DEVICE — LOU MINOR PROCEDURE: Brand: MEDLINE INDUSTRIES, INC.

## (undated) DEVICE — PAD,ABDOMINAL,8"X10",ST,LF: Brand: MEDLINE

## (undated) DEVICE — Device